# Patient Record
Sex: FEMALE | Race: WHITE
[De-identification: names, ages, dates, MRNs, and addresses within clinical notes are randomized per-mention and may not be internally consistent; named-entity substitution may affect disease eponyms.]

---

## 2019-04-28 ENCOUNTER — HOSPITAL ENCOUNTER (EMERGENCY)
Dept: HOSPITAL 92 - ERS | Age: 49
Discharge: HOME | End: 2019-04-28
Payer: COMMERCIAL

## 2019-04-28 DIAGNOSIS — Z79.899: ICD-10-CM

## 2019-04-28 DIAGNOSIS — E11.9: ICD-10-CM

## 2019-04-28 DIAGNOSIS — Z79.4: ICD-10-CM

## 2019-04-28 DIAGNOSIS — F17.210: ICD-10-CM

## 2019-04-28 DIAGNOSIS — L03.115: Primary | ICD-10-CM

## 2019-04-28 LAB
ALBUMIN SERPL BCG-MCNC: 3.6 G/DL (ref 3.5–5)
ALP SERPL-CCNC: 84 U/L (ref 40–150)
ALT SERPL W P-5'-P-CCNC: 10 U/L (ref 8–55)
ANION GAP SERPL CALC-SCNC: 13 MMOL/L (ref 10–20)
AST SERPL-CCNC: 11 U/L (ref 5–34)
BASOPHILS # BLD AUTO: 0 THOU/UL (ref 0–0.2)
BASOPHILS NFR BLD AUTO: 0.5 % (ref 0–1)
BILIRUB SERPL-MCNC: 0.3 MG/DL (ref 0.2–1.2)
BUN SERPL-MCNC: 20 MG/DL (ref 7–18.7)
CALCIUM SERPL-MCNC: 9.6 MG/DL (ref 7.8–10.44)
CHLORIDE SERPL-SCNC: 106 MMOL/L (ref 98–107)
CO2 SERPL-SCNC: 25 MMOL/L (ref 22–29)
CREAT CL PREDICTED SERPL C-G-VRATE: 0 ML/MIN (ref 70–130)
EOSINOPHIL # BLD AUTO: 0.1 THOU/UL (ref 0–0.7)
EOSINOPHIL NFR BLD AUTO: 0.9 % (ref 0–10)
GLOBULIN SER CALC-MCNC: 4.1 G/DL (ref 2.4–3.5)
GLUCOSE SERPL-MCNC: 217 MG/DL (ref 70–105)
HGB BLD-MCNC: 10.8 G/DL (ref 12–16)
LYMPHOCYTES # BLD: 1.9 THOU/UL (ref 1.2–3.4)
LYMPHOCYTES NFR BLD AUTO: 21.7 % (ref 21–51)
MCH RBC QN AUTO: 26.3 PG (ref 27–31)
MCV RBC AUTO: 82.3 FL (ref 78–98)
MONOCYTES # BLD AUTO: 0.5 THOU/UL (ref 0.11–0.59)
MONOCYTES NFR BLD AUTO: 6.1 % (ref 0–10)
NEUTROPHILS # BLD AUTO: 6.2 THOU/UL (ref 1.4–6.5)
NEUTROPHILS NFR BLD AUTO: 70.9 % (ref 42–75)
PLATELET # BLD AUTO: 310 THOU/UL (ref 130–400)
POTASSIUM SERPL-SCNC: 4.5 MMOL/L (ref 3.5–5.1)
RBC # BLD AUTO: 4.1 MILL/UL (ref 4.2–5.4)
SODIUM SERPL-SCNC: 139 MMOL/L (ref 136–145)
WBC # BLD AUTO: 8.7 THOU/UL (ref 4.8–10.8)

## 2019-04-28 PROCEDURE — 87040 BLOOD CULTURE FOR BACTERIA: CPT

## 2019-04-28 PROCEDURE — 36415 COLL VENOUS BLD VENIPUNCTURE: CPT

## 2019-04-28 PROCEDURE — 96367 TX/PROPH/DG ADDL SEQ IV INF: CPT

## 2019-04-28 PROCEDURE — 80053 COMPREHEN METABOLIC PANEL: CPT

## 2019-04-28 PROCEDURE — 96365 THER/PROPH/DIAG IV INF INIT: CPT

## 2019-04-28 PROCEDURE — 85025 COMPLETE CBC W/AUTO DIFF WBC: CPT

## 2019-04-28 PROCEDURE — 96366 THER/PROPH/DIAG IV INF ADDON: CPT

## 2019-04-28 NOTE — RAD
Right ankle: 3 views



INDICATIONS: Ankle pain.



FINDINGS: Severe degenerative changes at tibiotalar joint, talonavicular and visualized intertarsal j
oint. There is collapse of the talus and deformity of the navicular. Findings indicate a Charcot

type foot. There is joint effusion at the tibiotalar joint and at the talonavicular. Soft tissue swel
ling is prominent both medially and laterally. Pes planus.



IMPRESSION: Severe degenerative changes with deformity of tarsals  consistent with a Charcot type bushra
nt.



Reported By: Juan Moody 

Electronically Signed:  4/28/2019 12:58 PM

## 2019-08-11 ENCOUNTER — HOSPITAL ENCOUNTER (INPATIENT)
Dept: HOSPITAL 92 - ERS | Age: 49
LOS: 7 days | Discharge: HOME | DRG: 603 | End: 2019-08-18
Attending: INTERNAL MEDICINE | Admitting: INTERNAL MEDICINE
Payer: SELF-PAY

## 2019-08-11 VITALS — BODY MASS INDEX: 53.8 KG/M2

## 2019-08-11 DIAGNOSIS — Z91.040: ICD-10-CM

## 2019-08-11 DIAGNOSIS — D63.1: ICD-10-CM

## 2019-08-11 DIAGNOSIS — F17.210: ICD-10-CM

## 2019-08-11 DIAGNOSIS — E78.5: ICD-10-CM

## 2019-08-11 DIAGNOSIS — Z90.49: ICD-10-CM

## 2019-08-11 DIAGNOSIS — E11.610: ICD-10-CM

## 2019-08-11 DIAGNOSIS — L02.611: Primary | ICD-10-CM

## 2019-08-11 DIAGNOSIS — Z98.51: ICD-10-CM

## 2019-08-11 DIAGNOSIS — Z79.899: ICD-10-CM

## 2019-08-11 DIAGNOSIS — Z89.422: ICD-10-CM

## 2019-08-11 DIAGNOSIS — N17.9: ICD-10-CM

## 2019-08-11 DIAGNOSIS — L97.519: ICD-10-CM

## 2019-08-11 DIAGNOSIS — E11.65: ICD-10-CM

## 2019-08-11 DIAGNOSIS — N18.2: ICD-10-CM

## 2019-08-11 DIAGNOSIS — E11.22: ICD-10-CM

## 2019-08-11 DIAGNOSIS — L03.115: ICD-10-CM

## 2019-08-11 DIAGNOSIS — E87.1: ICD-10-CM

## 2019-08-11 DIAGNOSIS — Z79.84: ICD-10-CM

## 2019-08-11 DIAGNOSIS — E66.01: ICD-10-CM

## 2019-08-11 DIAGNOSIS — E11.621: ICD-10-CM

## 2019-08-11 LAB
ALBUMIN SERPL BCG-MCNC: 3.5 G/DL (ref 3.5–5)
ALP SERPL-CCNC: 80 U/L (ref 40–150)
ALT SERPL W P-5'-P-CCNC: 8 U/L (ref 8–55)
ANION GAP SERPL CALC-SCNC: 12 MMOL/L (ref 10–20)
AST SERPL-CCNC: 8 U/L (ref 5–34)
BASOPHILS # BLD AUTO: 0 THOU/UL (ref 0–0.2)
BASOPHILS NFR BLD AUTO: 0.3 % (ref 0–1)
BILIRUB SERPL-MCNC: 0.4 MG/DL (ref 0.2–1.2)
BUN SERPL-MCNC: 17 MG/DL (ref 7–18.7)
CALCIUM SERPL-MCNC: 9.1 MG/DL (ref 7.8–10.44)
CHLORIDE SERPL-SCNC: 102 MMOL/L (ref 98–107)
CO2 SERPL-SCNC: 25 MMOL/L (ref 22–29)
CREAT CL PREDICTED SERPL C-G-VRATE: 0 ML/MIN (ref 70–130)
EOSINOPHIL # BLD AUTO: 0.1 THOU/UL (ref 0–0.7)
EOSINOPHIL NFR BLD AUTO: 0.9 % (ref 0–10)
GLOBULIN SER CALC-MCNC: 4.1 G/DL (ref 2.4–3.5)
GLUCOSE SERPL-MCNC: 296 MG/DL (ref 70–105)
HGB BLD-MCNC: 10.2 G/DL (ref 12–16)
LYMPHOCYTES # BLD: 2.1 THOU/UL (ref 1.2–3.4)
LYMPHOCYTES NFR BLD AUTO: 19.6 % (ref 21–51)
MCH RBC QN AUTO: 27.1 PG (ref 27–31)
MCV RBC AUTO: 83.8 FL (ref 78–98)
MONOCYTES # BLD AUTO: 0.8 THOU/UL (ref 0.11–0.59)
MONOCYTES NFR BLD AUTO: 7.6 % (ref 0–10)
NEUTROPHILS # BLD AUTO: 7.6 THOU/UL (ref 1.4–6.5)
NEUTROPHILS NFR BLD AUTO: 71.6 % (ref 42–75)
PLATELET # BLD AUTO: 341 THOU/UL (ref 130–400)
POTASSIUM SERPL-SCNC: 4.1 MMOL/L (ref 3.5–5.1)
RBC # BLD AUTO: 3.78 MILL/UL (ref 4.2–5.4)
SODIUM SERPL-SCNC: 135 MMOL/L (ref 136–145)
WBC # BLD AUTO: 10.7 THOU/UL (ref 4.8–10.8)

## 2019-08-11 PROCEDURE — 36416 COLLJ CAPILLARY BLOOD SPEC: CPT

## 2019-08-11 PROCEDURE — 87070 CULTURE OTHR SPECIMN AEROBIC: CPT

## 2019-08-11 PROCEDURE — 76770 US EXAM ABDO BACK WALL COMP: CPT

## 2019-08-11 PROCEDURE — 36415 COLL VENOUS BLD VENIPUNCTURE: CPT

## 2019-08-11 PROCEDURE — 90471 IMMUNIZATION ADMIN: CPT

## 2019-08-11 PROCEDURE — 87205 SMEAR GRAM STAIN: CPT

## 2019-08-11 PROCEDURE — 78806: CPT

## 2019-08-11 PROCEDURE — 84300 ASSAY OF URINE SODIUM: CPT

## 2019-08-11 PROCEDURE — G0009 ADMIN PNEUMOCOCCAL VACCINE: HCPCS

## 2019-08-11 PROCEDURE — 81001 URINALYSIS AUTO W/SCOPE: CPT

## 2019-08-11 PROCEDURE — 83036 HEMOGLOBIN GLYCOSYLATED A1C: CPT

## 2019-08-11 PROCEDURE — 82570 ASSAY OF URINE CREATININE: CPT

## 2019-08-11 PROCEDURE — A4641 RADIOPHARM DX AGENT NOC: HCPCS

## 2019-08-11 PROCEDURE — 80202 ASSAY OF VANCOMYCIN: CPT

## 2019-08-11 PROCEDURE — 85652 RBC SED RATE AUTOMATED: CPT

## 2019-08-11 PROCEDURE — 85025 COMPLETE CBC W/AUTO DIFF WBC: CPT

## 2019-08-11 PROCEDURE — 80053 COMPREHEN METABOLIC PANEL: CPT

## 2019-08-11 PROCEDURE — 96365 THER/PROPH/DIAG IV INF INIT: CPT

## 2019-08-11 PROCEDURE — 96367 TX/PROPH/DG ADDL SEQ IV INF: CPT

## 2019-08-11 PROCEDURE — A9521 TC99M EXAMETAZIME: HCPCS

## 2019-08-11 PROCEDURE — 90732 PPSV23 VACC 2 YRS+ SUBQ/IM: CPT

## 2019-08-11 PROCEDURE — 80048 BASIC METABOLIC PNL TOTAL CA: CPT

## 2019-08-11 PROCEDURE — 87040 BLOOD CULTURE FOR BACTERIA: CPT

## 2019-08-11 RX ADMIN — INSULIN HUMAN PRN UNIT: 100 INJECTION, SOLUTION PARENTERAL at 17:19

## 2019-08-11 RX ADMIN — INSULIN HUMAN PRN UNIT: 100 INJECTION, SOLUTION PARENTERAL at 21:00

## 2019-08-11 NOTE — RAD
THREE VIEWS RIGHT FOOT:

 

HISTORY: 

Diabetic foot wound.  Subcutaneous soft tissue swelling right foot with ulceration.

 

COMPARISON: 

Fused right ankle on 4/28/2019.

 

FINDINGS: 

Again noted is deformity of the navicular bone as well as the anterior process of the talus with subc
hondral sclerosis and irregularity involving the tarsal bones.  There are irregular erosions and subc
hondral cystic changes involving the navicular bone as well as the distal aspect of the talus.  There
 is a pes planus-type deformity.  There is plantar displacement of the anterior process of the talus 
with irregular articulation of the navicular bone with the anterior process of the anterior aspect of
 the distal tibia with collapse of the mid foot.  Findings are likely related to a Charcot-type joint
 as suggested on the prior exam.  Plantar calcaneal enthesophyte is identified.  There is subcutaneou
s soft tissue swelling seen about the ankle much greater medially.  There is an oval-shaped area of s
ubcutaneous emphysema along the plantar aspect of the mid foot likely related to patient's known woun
d in this region.  Given the degree of irregularity involving the mid foot, osteomyelitis would be di
fficult to entirely exclude, although there has been no significant interval change compared to the p
rior exam.  There is evidence of a joint effusion.  

 

IMPRESSION: 

1.  Collapse of the mid foot with osseous irregularity and displacement of the anterior process of th
e talus with respect to articulation with the navicular bone with pes planus deformity.  Findings are
 likely related to Charcot-type joint.

 

2.  Diffuse subcutaneous soft tissue swelling and focus of gas in the plantar subcutaneous soft tissu
es likely related to patient's known wound in this region.

 

3.  Joint effusion.

 

4.  Given the appearance of the mid foot and areas of sclerosis and osseous irregularity, osteomyelit
is would be difficult to exclude based on these overlying changes.

 

POS: BAR

## 2019-08-11 NOTE — HP
PRIMARY CARE:  [facility].



PRIMARY PODIATRIST:  Ralf Carroll DPM.



CHIEF COMPLAINT:  Right foot redness and swelling of 2 days duration.



HISTORY OF PRESENT ILLNESS:  The patient is a 49-year-old  female with

Charcot foot, diabetes mellitus type 2, and left great toe amputation in the 
past

for osteomyelitis, presented to the emergency room with swelling and pain of the

right foot that started 2 days ago.  She also had significant warmth and pain.  
The

pain was mild.  She uses a walking boot.  She was evaluated by evaluated

at Podiatry Clinic 2 weeks ago.  She was advised to follow up with Wound Care 
for

chronic right foot ulcer.  She denies any fever or chills. 



PAST MEDICAL HISTORY:  

1. Diabetes mellitus type 2.

2. Chronic right foot ulcer.

3. Hypertension.

4. Dyslipidemia.

5. Osteomyelitis of the left great toe, status post amputation in 2014.

6. Morbid obesity.

7. Hypertension.



PAST SURGICAL HISTORY:  

1. Tubal ligation.

2. Left great toe amputation.

3. Cholecystectomy.



ALLERGIES:  NO KNOWN DRUG ALLERGIES.



MEDICATIONS:  Current home medications;

1. Glipizide 10 mg b.i.d. with meals.

2. Levemir 30 units b.i.d.

3. Lisinopril 5 mg daily.

4. Metformin 1000 mg b.i.d.



SOCIAL HISTORY:  The patient smokes on and off.  Denies any alcohol or drug use.



FAMILY HISTORY:  Diabetes runs in her family.



REVIEW OF SYSTEMS:  All other review of systems was reviewed and were found 
negative.



PHYSICAL EXAMINATION:

VITAL SIGNS:  Temperature 98.3, respirations 17, pulse of 93, blood pressure of

151/89, O2 saturation 98% on room air. 

GENERAL:  A 49-year-old female, in no apparent distress HEENT:  Head, 
atraumatic and

normocephalic.  Sclerae anicteric.  Moist mucous membranes.  No oral lesion. 

NECK:  Supple.  No JVD appreciated.  No carotid bruit. 

LUNGS:  Clear to auscultation bilaterally.  No wheezing, rales, or rhonchi. 

HEART:  S1 and S2 present.  Regular rate and rhythm.  No rubs or gallops. 

ABDOMEN:  Soft, nontender.  Bowel sounds present. 

EXTREMITIES:  There is erythema along with warmth and tenderness over the right

foot.  There is significant erythema mainly over the medial malleolar.  There is

dressing present all over the plantar aspect of the foot. 

SKIN:  As discussed above. 

LYMPH NODES:  No palpable lymph nodes in the neck. 

PERIPHERAL VASCULAR:  Radial pulses palpable bilaterally.  I was unable to feel 
the

dorsalis pedis, although posterior tibial pulses in the right foot. 



LABORATORY FINDINGS:  WBC 10.7, hemoglobin 10.2, hematocrit 31.7, and platelet 
count

of 341. 



ESR 76. 



Hemoglobin A1c 9.6.  Sodium 135, potassium 4.1, chloride 102, bicarb 25, BUN 17,

creatinine 0.87. 



Foot x-ray by my review was negative for osteomyelitis.  It showed Charcot joint

with diffuse subcutaneous soft tissue swelling.  There is gas in the plantar

subcutaneous soft tissue, likely related to the chronic ulcer. 



IMPRESSION:  

1. Right foot cellulitis/diabetic foot infection.

2. Diabetes mellitus type 2, uncontrolled.  A1c was 9.6.

3. Morbid obesity with a BMI of 53.8.

4. Chronic kidney disease, stage 2.

5. Hypertension.

6. Suspected sleep apnea.

7. Mild hyponatremia.

8. Elevated inflammatory markers.

9. Chronic anemia.



PLAN:  The patient will be monitored on the medical floor.  Vancomycin and Zosyn

will be continued.  Blood cultures have been sent.  Gentle hydration.  Consult

Podiatry in a.m.  The patient will be kept n.p.o. past midnight.  Diabetic diet 
for

now.  DVT prophylaxis with Lovenox.  Resume lisinopril, glipizide, metformin.  
Wound

Care consultation. 



Plan of care was discussed with the patient in detail, she stated understanding.







Job ID:  [id number]



MTDD

## 2019-08-12 LAB
ALBUMIN SERPL BCG-MCNC: 3.1 G/DL (ref 3.5–5)
ALP SERPL-CCNC: 66 U/L (ref 40–150)
ALT SERPL W P-5'-P-CCNC: (no result) U/L (ref 8–55)
ANION GAP SERPL CALC-SCNC: 14 MMOL/L (ref 10–20)
AST SERPL-CCNC: 11 U/L (ref 5–34)
BASOPHILS # BLD AUTO: 0.1 THOU/UL (ref 0–0.2)
BASOPHILS NFR BLD AUTO: 0.5 % (ref 0–1)
BILIRUB SERPL-MCNC: 0.3 MG/DL (ref 0.2–1.2)
BUN SERPL-MCNC: 14 MG/DL (ref 7–18.7)
CALCIUM SERPL-MCNC: 8.9 MG/DL (ref 7.8–10.44)
CHLORIDE SERPL-SCNC: 107 MMOL/L (ref 98–107)
CO2 SERPL-SCNC: 21 MMOL/L (ref 22–29)
CREAT CL PREDICTED SERPL C-G-VRATE: 179 ML/MIN (ref 70–130)
EOSINOPHIL # BLD AUTO: 0.2 THOU/UL (ref 0–0.7)
EOSINOPHIL NFR BLD AUTO: 2.6 % (ref 0–10)
GLOBULIN SER CALC-MCNC: 3.7 G/DL (ref 2.4–3.5)
GLUCOSE SERPL-MCNC: 93 MG/DL (ref 70–105)
HGB BLD-MCNC: 9.1 G/DL (ref 12–16)
LYMPHOCYTES # BLD: 2.4 THOU/UL (ref 1.2–3.4)
LYMPHOCYTES NFR BLD AUTO: 26.1 % (ref 21–51)
MCH RBC QN AUTO: 26.1 PG (ref 27–31)
MCV RBC AUTO: 83.1 FL (ref 78–98)
MONOCYTES # BLD AUTO: 0.8 THOU/UL (ref 0.11–0.59)
MONOCYTES NFR BLD AUTO: 8.3 % (ref 0–10)
NEUTROPHILS # BLD AUTO: 5.8 THOU/UL (ref 1.4–6.5)
NEUTROPHILS NFR BLD AUTO: 62.5 % (ref 42–75)
PLATELET # BLD AUTO: 288 THOU/UL (ref 130–400)
POTASSIUM SERPL-SCNC: 4.1 MMOL/L (ref 3.5–5.1)
RBC # BLD AUTO: 3.49 MILL/UL (ref 4.2–5.4)
SODIUM SERPL-SCNC: 138 MMOL/L (ref 136–145)
VANCOMYCIN TROUGH SERPL-MCNC: 29.5 UG/ML
WBC # BLD AUTO: 9.3 THOU/UL (ref 4.8–10.8)

## 2019-08-12 NOTE — MRI
MRI RIGHT FOOT:

 

08/12/2019

 

PROVIDED CLINICAL HISTORY:

Chronic nonhealing open wound on the plantar surface of the right foot for two months.

 

COMPARISON:

Radiographs performed on 08/11/2019.

 

FINDINGS:

Evaluation is limited by patient motion.

 

There is conspicuous diminished signal intensity on T1 weighted sequences and increased signal intens
ity on fluid sensitive sequences involving the talus, anterior calcaneus, distal tibia, distal fibula
, and navicula.  To a lesser extent, the cuboid and cuneiforms demonstrate similar signal alteration.
  There is extensive fragment irregularity to the navicular cortical margins at the talar articular s
urface.  There is dorsal dislocation of the head of the talus with respect to the navicula.  There is
 a tibiotalar joint effusion.  There is signal alteration within the subcutaneous adipose layer, at t
he plantar aspect of the hindfoot-midfoot junction, medially, which approximates the dislocated talar
 head.

 

There is greater than physiologic tenosynovial fluid involving the medial flexor and peroneal tendons
.

 

Assessment for a focal fluid collection, such as abscess, is limited, given lack of IV contrast mater
ial.  There is conspicuous fluid signal intensity about the flexor hallucis longus tendon in its cour
se within the midfoot.

 

IMPRESSION:

1.  Diffuse hindfoot and midfoot signal abnormality with associated talonavicular dislocation.  This 
can be seen in the setting of a neuropathic joint.  Given the proximity to an open wound, changes of 
osteomyelitis and tibiotalar septic arthritis cannot be excluded.

 

2.  Medial flexor and peroneal tenosynovial fluid that may reflect infectious tenosynovitis.

 

POS: EMMETT

## 2019-08-12 NOTE — PDOC.EVN
Event Note





- Event Note


Event Note: 





One of 2 blood cultures positive; continue current Rx.

## 2019-08-12 NOTE — PDOC.HOSPP
- Subjective


Encounter Date: 08/12/19 (f/u foot ulcer)


Encounter Time: 15:09


Subjective: 





Pt hungry, c/o headache, has been kept NPO today in case of Podiatry 

intervention.  Denies any other concerns.





- Objective


Vital Signs & Weight: 


 Vital Signs (12 hours)











  Temp Pulse Resp BP BP BP Pulse Ox


 


 08/12/19 12:00  98.1 F      


 


 08/12/19 11:00  98.1 F  83  18    129/75  99


 


 08/12/19 09:17   76   113/68   


 


 08/12/19 08:00  98.1 F  76  18   113/68   98


 


 08/12/19 04:00  98.0 F  76  18    127/62  97








 Weight











Admit Weight                   294 lb 1.6 oz


 


Weight                         294 lb 1.6 oz














I&O: 


 











 08/11/19 08/12/19 08/13/19





 06:59 06:59 06:59


 


Intake Total  2305 


 


Balance  2305 











Result Diagrams: 


 08/12/19 06:29





 08/12/19 06:29


Additional Labs: 


 Accuchecks











  08/12/19 08/12/19 08/11/19





  11:20 05:03 20:48


 


POC Glucose  133 H  94  289 H














  08/11/19





  16:49


 


POC Glucose  249 H














ROS





- Medication


Medications: 


Active Medications











Generic Name Dose Route Start Last Admin





  Trade Name Freq  PRN Reason Stop Dose Admin


 


Acetaminophen  650 mg  08/11/19 12:31  08/12/19 11:29





  Tylenol  PO   650 mg





  Q4H PRN   Administration





  Headache/Fever/Mild Pain (1-3)   





     





     





     


 


Enoxaparin Sodium  40 mg  08/12/19 09:00  08/12/19 09:15





  Lovenox  SC   Not Given





  0900 [facility]   





     





     





     





     


 


Glipizide  10 mg  08/12/19 07:30  08/12/19 08:33





  Glucotrol  PO   Not Given





  BID-AC ALVAREZ   





     





     





     





     


 


Vancomycin HCl 2 gm/ Sodium  500 mls @ 250 mls/hr  08/11/19 23:59  08/12/19 12:

29





  Chloride  IVPB   500 mls





  1200,2359 ALVAREZ   Administration





     





     





     





     


 


Insulin Glargine 20 units/  0.2 mls @ 0 mls/hr  08/12/19 09:00  08/12/19 09:16





  Miscellaneous Medication  SC   Not Given





  QAM ALVAREZ   





     





     





     





     


 


Piperacillin Sod/Tazobactam  100 mls @ 200 mls/hr  08/11/19 18:00  08/12/19 11:

31





  Sod 3.375 gm/ Sodium Chloride  IVPB   100 mls





  0600,1200,1800,2359 ALVAREZ   Administration





     





     





     





     


 


Insulin Human Regular  0 units  08/11/19 12:31  08/11/19 17:19





  Humulin R  SC   4 unit





  .MODERATE SLIDING SC PRN   Administration





  Moderate Correctional Scale   





     





     





     


 


Insulin Human Regular  0 units  08/11/19 12:31  08/11/19 21:00





  Humulin R  SC   3 unit





  .BEDTIME SLIDING SC PRN   Administration





  Bedtime Correctional Scale   





     





     





     


 


Lisinopril  5 mg  08/12/19 09:00  08/12/19 09:17





  Zestril  PO   5 mg





  DAILY ALVAREZ   Administration





     





     





     





     


 


Metformin HCl  1,000 mg  08/11/19 17:00  08/12/19 09:15





  Glucophage  PO   Not Given





  BID-WM ALVAREZ   





     





     





     





     














- Exam


NAD


Heart: RRR, no murmur


Respiratory: CTAB, no wheezes, no rales, no ronchi


Gastrointestinal: soft, non-tender, non-distended, normal bowel sounds


Extremities: no cyanosis


Extremeties - other findings: right foot - charcot apperaance, erythema medially

, ulcer on foot


Psychiatric: normal affect





Hosp A/P


(1) Foot ulcer


Code(s): L97.509 - NON-PRESSURE CHRONIC ULCER OTH PRT UNSP FOOT W UNSP SEVERITY

   Status: Acute   


Qualifiers: 


   Laterality: right   Non-pressure ulcer stage: unspecified non-pressure ulcer 

stage   Qualified Code(s): L97.519 - Non-pressure chronic ulcer of other part 

of right foot with unspecified severity   





(2) Diabetes mellitus


Code(s): E11.9 - TYPE 2 DIABETES MELLITUS WITHOUT COMPLICATIONS   Status: 

Chronic   


Qualifiers: 


   Diabetes mellitus type: type 2   Diabetes mellitus long term insulin use: 

with long term use   Diabetes mellitus complication status: with kidney 

complications   Chronic kidney disease stage: stage 2 (mild) 





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


Code(s): E11.22 - TYPE 2 DIABETES MELLITUS W DIABETIC CHRONIC KIDNEY DISEASE; 

N18.2 - CHRONIC KIDNEY DISEASE, STAGE 2 (MILD)   Status: Chronic   





(4) Hypertension


Code(s): I10 - ESSENTIAL (PRIMARY) HYPERTENSION   Status: Chronic   


Qualifiers: 


   Hypertension type: essential hypertension   Qualified Code(s): I10 - 

Essential (primary) hypertension   





(5) Obesity


Code(s): E66.9 - OBESITY, UNSPECIFIED   Status: Chronic   


Qualifiers: 


   Body mass index: BMI 50.0-59.9 





(6) Charcot's joint


Code(s): M14.60 - CHARCOT'S JOINT, UNSPECIFIED SITE   Status: Chronic   





(7) Anemia


Code(s): D64.9 - ANEMIA, UNSPECIFIED   Status: Chronic   


Qualifiers: 


   Anemia type: unspecified type   Qualified Code(s): D64.9 - Anemia, 

unspecified   





- Plan





Foot ulcer with medial foot cellulitis in the context of Charcot foot


- continue Vanc & Zosyn


- Consult ID


- Consult Podiatry placed


- obtain MRI to look for changes such as osteomyelitis - given underlying bone 

destruction assoc with Charcot - uncertain if we will be able to tell by MRI 

but will try





DM - continue lower dose lantus and adjust as tolerated


- continue other 2 oral meds


- advance diet to carb consistent





dvt prophy - lovenox


gi prophy - not indicated


code status full





anticipate pt will be here 2-3 days at a minimum for tx.  Reviewed plan of care 

with patient, no questions or further needs at end of eval.  Pt demonstrates 

understanding and agrees.

## 2019-08-13 LAB
ANION GAP SERPL CALC-SCNC: 12 MMOL/L (ref 10–20)
BASOPHILS # BLD AUTO: 0.1 THOU/UL (ref 0–0.2)
BASOPHILS NFR BLD AUTO: 0.9 % (ref 0–1)
BUN SERPL-MCNC: 13 MG/DL (ref 7–18.7)
CALCIUM SERPL-MCNC: 8.7 MG/DL (ref 7.8–10.44)
CHLORIDE SERPL-SCNC: 106 MMOL/L (ref 98–107)
CO2 SERPL-SCNC: 24 MMOL/L (ref 22–29)
CREAT CL PREDICTED SERPL C-G-VRATE: 135 ML/MIN (ref 70–130)
EOSINOPHIL # BLD AUTO: 0.1 THOU/UL (ref 0–0.7)
EOSINOPHIL NFR BLD AUTO: 1.4 % (ref 0–10)
GLUCOSE SERPL-MCNC: 208 MG/DL (ref 70–105)
HGB BLD-MCNC: 9.6 G/DL (ref 12–16)
LYMPHOCYTES # BLD: 1.7 THOU/UL (ref 1.2–3.4)
LYMPHOCYTES NFR BLD AUTO: 22 % (ref 21–51)
MCH RBC QN AUTO: 27.6 PG (ref 27–31)
MCV RBC AUTO: 83.2 FL (ref 78–98)
MONOCYTES # BLD AUTO: 0.5 THOU/UL (ref 0.11–0.59)
MONOCYTES NFR BLD AUTO: 6.7 % (ref 0–10)
NEUTROPHILS # BLD AUTO: 5.4 THOU/UL (ref 1.4–6.5)
NEUTROPHILS NFR BLD AUTO: 69 % (ref 42–75)
PLATELET # BLD AUTO: 327 THOU/UL (ref 130–400)
POTASSIUM SERPL-SCNC: 4 MMOL/L (ref 3.5–5.1)
RBC # BLD AUTO: 3.48 MILL/UL (ref 4.2–5.4)
SODIUM SERPL-SCNC: 138 MMOL/L (ref 136–145)
WBC # BLD AUTO: 7.9 THOU/UL (ref 4.8–10.8)

## 2019-08-13 RX ADMIN — INSULIN GLARGINE SCH MLS: 100 INJECTION, SOLUTION SUBCUTANEOUS at 09:09

## 2019-08-13 RX ADMIN — INSULIN HUMAN PRN UNIT: 100 INJECTION, SOLUTION PARENTERAL at 17:45

## 2019-08-13 RX ADMIN — INSULIN HUMAN PRN UNIT: 100 INJECTION, SOLUTION PARENTERAL at 11:55

## 2019-08-13 RX ADMIN — INSULIN HUMAN PRN UNIT: 100 INJECTION, SOLUTION PARENTERAL at 05:47

## 2019-08-13 NOTE — PDOC.HOSPP
- Subjective


Encounter Date: 08/13/19 (f/u DM foot ulcer)


Encounter Time: 14:17


Subjective: 





Pt denies any changes currently. Denies pain, n/v/abd pain/diarrhea or other 

concerns





- Objective


Vital Signs & Weight: 


 Vital Signs (12 hours)











  Temp Pulse Resp BP BP BP Pulse Ox


 


 08/13/19 11:00  98.3 F  81  20    135/80  100


 


 08/13/19 09:01   80   135/80   


 


 08/13/19 07:27  98.6 F  80  18   135/80   98


 


 08/13/19 04:00  99.2 F  85  20    142/82 H  97








 Weight











Admit Weight                   294 lb 1.6 oz


 


Weight                         294 lb 1.6 oz














I&O: 


 











 08/12/19 08/13/19 08/14/19





 06:59 06:59 06:59


 


Intake Total 2305 1890 


 


Balance 2305 1890 











Result Diagrams: 


 08/13/19 07:47





 08/13/19 05:35


Additional Labs: 


 Accuchecks











  08/13/19 08/13/19 08/12/19





  11:21 05:08 20:34


 


POC Glucose  194 H  228 H  198 H














  08/12/19





  15:50


 


POC Glucose  112 H














ROS





- Medication


Medications: 


Active Medications











Generic Name Dose Route Start Last Admin





  Trade Name Freq  PRN Reason Stop Dose Admin


 


Acetaminophen  650 mg  08/11/19 12:31  08/12/19 11:29





  Tylenol  PO   650 mg





  Q4H PRN   Administration





  Headache/Fever/Mild Pain (1-3)   





     





     





     


 


Enoxaparin Sodium  40 mg  08/12/19 09:00  08/13/19 09:02





  Lovenox  SC   40 mg





  0900 ALVAREZ   Administration





     





     





     





     


 


Glipizide  10 mg  08/12/19 07:30  08/13/19 09:12





  Glucotrol  PO   Not Given





  BID-AC ALVAREZ   





     





     





     





     


 


Piperacillin Sod/Tazobactam  100 mls @ 200 mls/hr  08/11/19 18:00  08/13/19 11:

54





  Sod 3.375 gm/ Sodium Chloride  IVPB   100 mls





  0600,1200,1800,2359 ALVAREZ   Administration





     





     





     





     


 


Insulin Glargine 30 units/  0.3 mls @ 0 mls/hr  08/13/19 09:00  08/13/19 09:09





  Miscellaneous Medication  SC   0.3 mls





  QAM ALVAREZ   Administration





     





     





     





     


 


Insulin Human Regular  0 units  08/11/19 12:31  08/13/19 11:55





  Humulin R  SC   2 unit





  .MODERATE SLIDING SC PRN   Administration





  Moderate Correctional Scale   





     





     





     


 


Insulin Human Regular  0 units  08/11/19 12:31  08/11/19 21:00





  Humulin R  SC   3 unit





  .BEDTIME SLIDING SC PRN   Administration





  Bedtime Correctional Scale   





     





     





     


 


Lisinopril  5 mg  08/12/19 09:00  08/13/19 09:01





  Zestril  PO   5 mg





  DAILY ALVAREZ   Administration





     





     





     





     


 


Metformin HCl  1,000 mg  08/11/19 17:00  08/13/19 09:00





  Glucophage  PO   1,000 mg





  BID-WM ALVAREZ   Administration





     





     





     





     














- Exam


NAD


Heart: RRR, no murmur, no gallops, no rubs


Respiratory: CTAB, no wheezes, no rales


Gastrointestinal: soft, non-tender, non-distended, normal bowel sounds


Extremities: no cyanosis


Skin - other findings: Unchanged charcot foot with erythema/edema medially, 

ulcer on dorsum


Psychiatric: normal affect





Hosp A/P


(1) Foot ulcer


Code(s): L97.509 - NON-PRESSURE CHRONIC ULCER OTH PRT UNSP FOOT W UNSP SEVERITY

   Status: Acute   


Qualifiers: 


   Laterality: right   Non-pressure ulcer stage: unspecified non-pressure ulcer 

stage   Qualified Code(s): L97.519 - Non-pressure chronic ulcer of other part 

of right foot with unspecified severity   





(2) Diabetes mellitus


Code(s): E11.9 - TYPE 2 DIABETES MELLITUS WITHOUT COMPLICATIONS   Status: 

Chronic   


Qualifiers: 


   Diabetes mellitus type: type 2   Diabetes mellitus long term insulin use: 

with long term use   Diabetes mellitus complication status: with kidney 

complications   Chronic kidney disease stage: stage 2 (mild) 





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


Code(s): E11.22 - TYPE 2 DIABETES MELLITUS W DIABETIC CHRONIC KIDNEY DISEASE; 

N18.2 - CHRONIC KIDNEY DISEASE, STAGE 2 (MILD)   Status: Chronic   





(4) Hypertension


Code(s): I10 - ESSENTIAL (PRIMARY) HYPERTENSION   Status: Chronic   


Qualifiers: 


   Hypertension type: essential hypertension   Qualified Code(s): I10 - 

Essential (primary) hypertension   





(5) Obesity


Code(s): E66.9 - OBESITY, UNSPECIFIED   Status: Chronic   


Qualifiers: 


   Body mass index: BMI 50.0-59.9 





(6) Charcot's joint


Code(s): M14.60 - CHARCOT'S JOINT, UNSPECIFIED SITE   Status: Chronic   





(7) Anemia


Code(s): D64.9 - ANEMIA, UNSPECIFIED   Status: Chronic   


Qualifiers: 


   Anemia type: unspecified type   Qualified Code(s): D64.9 - Anemia, 

unspecified   





- Plan





Foot ulcer with medial foot cellulitis in the context of Charcot foot


- continue Zosyn - will d/c Vanc


- Consult ID placed last night


- Consult Podiatry placed - called Dr. Carroll's office and told that Dr. Owens 

will see the patient this afternoon after clinic


- MRI complete - cannot rule in or out osteomyelitis


- wound care consult placed





Slight change in creatinine - d/c Vanc as I'm not certain it is needed, monitor 

renal function, encourage PO fluid intake





DM - increase AM lantus to 30 units and consider adding back HS dose of 30 units





dvt prophy - lovenox


gi prophy - not indicated


code status full





anticipate pt will be here 2-3 days at a minimum for tx.  Reviewed plan of care 

with patient, no questions or further needs at end of eval.  Pt demonstrates 

understanding and agrees.

## 2019-08-13 NOTE — CON
DATE OF CONSULTATION:  08/13/2019



REASON FOR CONSULTATION:  Infection in right foot.



HISTORY OF PRESENT ILLNESS:  A 49-year-old  female with history of type 
2

diabetes, Charcot deformity of the right foot, left great toe amputation in the 
past

for osteomyelitis.  She has been treated by Dr. Carroll in our clinic for a 
chronic

right foot ulceration to the plantar mid foot.  She had recently been referred 
over

to [facility] for advanced care.  She states she has been using a walking

boot and believes over the weekend that she rubbed an irritating area on the 
medial

ankle from that boot, it turned black and then on Sunday she had noticed that it

turned red.  She was instructed by Dr. Carroll to present to the emergency 
department

if she ever had any fever or worsening signs of infection, so she did.  She was

admitted to the hospital and placed on vancomycin and Zosyn.  Had x-ray and MRI 
of

her right foot, which were inconclusive for osteomyelitis due to the underlying

Charcot deformity and previous fractures and dislocations within the foot.  
Denies

any fevers or chills. 



PAST MEDICAL HISTORY:  

1. Type 2 diabetes.

2. Chronic right foot ulceration.

3. Hypertension.

4. Dyslipidemia.

5. Osteomyelitis of left great toe, status post amputation in 2014.

6. Morbid obesity.



PAST SURGICAL HISTORY:  

1. Tubal ligation.

2. Left great toe amputation.

3. Cholecystectomy.



MEDICATIONS:  

1. Glipizide.

2. Levemir.

3. Lisinopril.

4. Metformin.



ALLERGIES:  NO KNOWN DRUG ALLERGIES.



REVIEW OF SYSTEMS:  All other review of systems reviewed and all were found 
negative

other than what was indicated in the history of present illness. 



PHYSICAL EXAMINATION:

VITAL SIGNS:  Maximum temperature over the last 24 hours 98.6.  Most recent 
pulse is

81, respirations 20, blood pressure 159/80. 

EXTREMITIES:  Focal lower extremity exam, the patient has a rocker-bottom foot 
type

deformity with collapse of the medial longitudinal arch and an abducted foot on 
the

ankle. 

DERMATOLOGIC:  There is a large chronic ulceration on the plantar aspect of the

right medial mid foot.  This has a hyperkeratotic rim.  The wound measures

approximately 1.8 x 1.6 cm with 0.4 cm of depth and has a granular and fibrous 
mixed

base.  There is no drainage or malodor from this wound.  No periwound erythema.
  On

the medial ankle, there is a superficial abscess present with palpable fluid

collection just deep to the superficial skin.  Does not feel that there is any

fluctuance or deep abscess in this area.  There is erythema circumferentially 
around

centrally located on this area and it measures 6 cm in diameter.  No ascending

lymphangitis present. 



LABORATORY DATA:  Most recently, white blood cell count was 7.9, hemoglobin 9.6,

hematocrit 29.0, and platelets 327.  Sodium 138, potassium 4.0, chloride 106, 
carbon

dioxide 24, BUN 13, creatinine 1.05, glucose was 208. 



RADIOGRAPHIC EVALUATION:  I reviewed the films of her right foot and MRI, some

increased fluid sensitive sequence uptake throughout the bones of the hindfoot 
and

ankle.  This could be consistent with the Charcot deformity she has.  X-ray 
show no

new areas of cortical erosion or fractures as compared to previous films of the

ankle from April 28, 2019. 



ASSESSMENT:  

1. Superficial abscess to the medial ankle/hindfoot, right side.

2. Cellulitis.

3. Type 2 diabetes with peripheral neuropathy and Charcot joints.



PLAN:  

1. The area was prepped at bedside utilizing Betadine and alcohol.  A 15 blade 
was

used to sarika the superficial abscess, approximately 2 mL of creamy white 
purulence

was expressed from the wound.  This was not cultured as Dr. Espino actually used 
a

needle to culture the purulence earlier today.  Small pieces of undermined 
tissue

were excised. We decided to leave this wound open.  The wound was flushed with

sterile saline and then packed open with iodoform packing gauze.  Bandages were 
then

applied including 4 x 4 gauze and 4-inch Clementina wrap. 

2. Continue with the Zosyn IV until the culture results are obtained.  She was

recently discontinued from vancomycin, which I feel is fine at this time. 

3. No further surgical interventions required at this time.  I will re-evaluate 
her

tomorrow to look for progression. 

4. Appreciate Dr. Espino' input on antibiotic choice once we receive the 
cultures. 



Thank you for the consultation.







Job ID:  [id number]



[facility]

## 2019-08-13 NOTE — CON
DATE OF CONSULTATION:  08/13/2019



HISTORY OF PRESENT ILLNESS:  A 49-year-old with history of type 2 diabetes, Charcot

arthropathy of right foot with chronic round ulcer of the midaspect of the plantar

surface of the right mid-foot region, who has been managed by Dr. Carroll.  The

patient developed inflammatory changes in medial aspect of the right hindfoot region

below the medial malleolus, and she was admitted.  Now, she has developed what she

describes as a blister directly at the center of this erythematous area.  A little

bit of chills.  No headaches.  No change in visual symptoms.  No sore throat,

odynophagia, or dysphagia.  No dyspnea or cough.  No abdominal pain or diarrhea.  No

genitourinary symptoms. 



PAST MEDICAL HISTORY:  Type 2 diabetes, neuropathy, Charcot arthropathy, chronic

right foot ulcer, dyslipidemia, hypertension, osteomyelitis of left great toe with

amputation, obesity, hypertension. 



PAST SURGICAL HISTORY:  Tubal ligation, left great toe amputation, cholecystectomy.



ALLERGIES:  NONE.



SOCIAL HISTORY:  Current smoker.  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  No

alcoholic beverage use.  Lives in [place] with family. 



FAMILY HISTORY:  Diabetes.



CURRENT MEDICATIONS:  

1. Tylenol.

2. Tums.

3. Lovenox.

4. Glucotrol.

5. Glucagon.

6. Insulin.

7. Zestril.

8. Glucophage.

9. Zofran.

10. Zosyn.



PHYSICAL EXAMINATION:

VITAL SIGNS:  T-max 99.9 and currently 98.3, blood pressure 130/80, pulse 80,

respirations 18, O2 saturation 98. 

SKIN:  Area at the medial right hindfoot region with a central blister.  There was

an extensive erythema surrounding this area and bulging.  No obvious drainage. 

HEENT:  No lymphadenopathy.  Ocular movements conjugate.  Oral cavity with quite a

few missing teeth.  No oral mucosal lesions. 

NECK:  Supple.  No jugular venous distention or carotid bruits. 

LUNGS:  Symmetric, clear breath sounds. 

HEART:  S1 and S2, regular rate.  No S3 or S4. 

ABDOMEN:  Soft, not distended or tender.  No ascites.  No bladder distention. 

EXTREMITIES:  No joint inflammatory activity outside the area of involvement.

Pulses are 1+ in dorsalis pedis. 

NEUROLOGIC:  Nonfocal including cognitive function.



LABORATORY DATA:  White cell count 10.7 and 7.9, hemoglobin 9.6, platelets 341 and

327, creatinine 1.05.  Liver profile normal.  Albumin 3.5.  Microbiology with

bacillus, probably a contaminant of one set of blood cultures.  There was an MRI of

the right foot and ankle, which showed diffuse signal abnormality hindfoot,

talonavicular dislocation and peroneal and medial flexor tenosynovial fluid. 



ASSESSMENT:  

1. Chronic ulcer, bottom aspect of the right foot with now development of

inflammatory changes with a blister.  The blister was punctured and light yellow

fluid was submitted for culture. 

2. Type 2 diabetes.



DISCUSSION:  The differential diagnosis includes cellulitis versus tenosynovitis

versus osteomyelitis of the right hind and mid-foot region associated with Charcot

arthropathy.  We will monitor culture results.  This area would be difficult for

debridement.  I do not think that anybody would consider doing a debridement there

other than Dr. Roach.  We will wait for the culture results and then make a plan. 





Job ID:  [id number]

## 2019-08-14 LAB
ANION GAP SERPL CALC-SCNC: 12 MMOL/L (ref 10–20)
BUN SERPL-MCNC: 13 MG/DL (ref 7–18.7)
CALCIUM SERPL-MCNC: 9.1 MG/DL (ref 7.8–10.44)
CHLORIDE SERPL-SCNC: 108 MMOL/L (ref 98–107)
CO2 SERPL-SCNC: 23 MMOL/L (ref 22–29)
CREAT CL PREDICTED SERPL C-G-VRATE: 127 ML/MIN (ref 70–130)
GLUCOSE SERPL-MCNC: 113 MG/DL (ref 70–105)
LEUKOCYTE ESTERASE UR QL STRIP.AUTO: 75 LEU/UL
POTASSIUM SERPL-SCNC: 3.8 MMOL/L (ref 3.5–5.1)
PROT UR STRIP.AUTO-MCNC: 30 MG/DL
RBC UR QL AUTO: (no result) HPF (ref 0–3)
SODIUM SERPL-SCNC: 139 MMOL/L (ref 136–145)
SODIUM UR-SCNC: 74 MMOL/L
WBC UR QL AUTO: (no result) HPF (ref 0–3)

## 2019-08-14 PROCEDURE — 0H9MXZZ DRAINAGE OF RIGHT FOOT SKIN, EXTERNAL APPROACH: ICD-10-PCS | Performed by: PODIATRIST

## 2019-08-14 RX ADMIN — INSULIN GLARGINE SCH MLS: 100 INJECTION, SOLUTION SUBCUTANEOUS at 08:27

## 2019-08-14 NOTE — PDOC.HOSPP
- Subjective


Encounter Date: 08/14/19 (f/u foot infection)


Encounter Time: 10:39


Subjective: 





Pt is without complaints today - denies n/v/abd pain/diarrhea.  Denies any 

signfiicant foot pain.  





- Objective


Vital Signs & Weight: 


 Vital Signs (12 hours)











  Temp Pulse Resp BP BP Pulse Ox


 


 08/14/19 08:17   77   136/79  


 


 08/14/19 07:37  98.9 F  77  16   136/79  96








 Weight











Admit Weight                   294 lb 1.6 oz


 


Weight                         294 lb 1.6 oz














I&O: 


 











 08/13/19 08/14/19 08/15/19





 06:59 06:59 06:59


 


Intake Total 1890 1010 


 


Balance 1890 1010 











Result Diagrams: 


 08/13/19 07:47





 08/14/19 08:49


Additional Labs: 


 Accuchecks











  08/14/19 08/13/19 08/13/19





  05:50 20:16 16:33


 


POC Glucose  138 H  194 H  194 H














  08/13/19





  11:21


 


POC Glucose  194 H














ROS





- Medication


Medications: 


Active Medications











Generic Name Dose Route Start Last Admin





  Trade Name Freq  PRN Reason Stop Dose Admin


 


Acetaminophen  650 mg  08/11/19 12:31  08/12/19 11:29





  Tylenol  PO   650 mg





  Q4H PRN   Administration





  Headache/Fever/Mild Pain (1-3)   





     





     





     


 


Enoxaparin Sodium  40 mg  08/12/19 09:00  08/14/19 08:18





  Lovenox  SC   40 mg





  0900 ALVAREZ   Administration





     





     





     





     


 


Glipizide  10 mg  08/12/19 07:30  08/14/19 08:17





  Glucotrol  PO   10 mg





  BID-AC ALVAREZ   Administration





     





     





     





     


 


Piperacillin Sod/Tazobactam  100 mls @ 200 mls/hr  08/11/19 18:00  08/14/19 05:

14





  Sod 3.375 gm/ Sodium Chloride  IVPB   100 mls





  0600,1200,1800,2359 ALVAREZ   Administration





     





     





     





     


 


Insulin Glargine 30 units/  0.3 mls @ 0 mls/hr  08/13/19 09:00  08/14/19 08:27





  Miscellaneous Medication  SC   0.3 mls





  QAM ALVAREZ   Administration





     





     





     





     


 


Insulin Human Regular  0 units  08/11/19 12:31  08/13/19 17:45





  Humulin R  SC   2 unit





  .MODERATE SLIDING SC PRN   Administration





  Moderate Correctional Scale   





     





     





     


 


Insulin Human Regular  0 units  08/11/19 12:31  08/11/19 21:00





  Humulin R  SC   3 unit





  .BEDTIME SLIDING SC PRN   Administration





  Bedtime Correctional Scale   





     





     





     














- Exam


NAD


Heart: RRR, no murmur, no gallops, no rubs


Respiratory: CTAB, no wheezes, no rales, no ronchi


Gastrointestinal: soft, non-tender, non-distended, normal bowel sounds


Extremities: no cyanosis, no clubbing


Skin - other findings: foot bandaged with blood staining medially


Psychiatric: normal affect





Hosp A/P


(1) Foot ulcer


Code(s): L97.509 - NON-PRESSURE CHRONIC ULCER OTH PRT UNSP FOOT W UNSP SEVERITY

   Status: Acute   


Qualifiers: 


   Laterality: right   Non-pressure ulcer stage: unspecified non-pressure ulcer 

stage   Qualified Code(s): L97.519 - Non-pressure chronic ulcer of other part 

of right foot with unspecified severity   





(2) Diabetes mellitus


Code(s): E11.9 - TYPE 2 DIABETES MELLITUS WITHOUT COMPLICATIONS   Status: 

Chronic   


Qualifiers: 


   Diabetes mellitus type: type 2   Diabetes mellitus long term insulin use: 

with long term use   Diabetes mellitus complication status: with kidney 

complications   Chronic kidney disease stage: stage 2 (mild) 





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


Code(s): E11.22 - TYPE 2 DIABETES MELLITUS W DIABETIC CHRONIC KIDNEY DISEASE; 

N18.2 - CHRONIC KIDNEY DISEASE, STAGE 2 (MILD)   Status: Chronic   





(4) Hypertension


Code(s): I10 - ESSENTIAL (PRIMARY) HYPERTENSION   Status: Chronic   


Qualifiers: 


   Hypertension type: essential hypertension   Qualified Code(s): I10 - 

Essential (primary) hypertension   





(5) Obesity


Code(s): E66.9 - OBESITY, UNSPECIFIED   Status: Chronic   


Qualifiers: 


   Body mass index: BMI 50.0-59.9 





(6) Charcot's joint


Code(s): M14.60 - CHARCOT'S JOINT, UNSPECIFIED SITE   Status: Chronic   





(7) Anemia


Code(s): D64.9 - ANEMIA, UNSPECIFIED   Status: Chronic   


Qualifiers: 


   Anemia type: unspecified type   Qualified Code(s): D64.9 - Anemia, 

unspecified   





(8) KATTY (acute kidney injury)


Code(s): N17.9 - ACUTE KIDNEY FAILURE, UNSPECIFIED   Status: Acute   





- Plan





Foot ulcer with medial foot cellulitis in the context of Charcot foot


- continue Zosyn - Vanc d/c yesterday


- Appreciate ID and Podiatry consults - s/p I&D and culture obtained without 

growth so far


- MRI complete - cannot rule in or out osteomyelitis





KATTY - d/c metformin and lisinopril, gentle IVF, obtain urine studies and renal 

US.  If worsening - consider Nephrology consult. 





DM -controlled - continue with once daily lantus - add BID/home dosing as blood 

sugars increase





dvt prophy - ambulatory - will d/c lovenox


gi prophy - not indicated


code status full





anticipate pt will be here another few days for determination of antibiotics, 

monitoring of renal function.  Reviewed plan of care wiht patient, no questions 

or further needs at end of eval

## 2019-08-14 NOTE — ULT
Exam: Bilateral renal ultrasound



HISTORY: Acute kidney insufficiency



COMPARISON: None





FINDINGS: 

Right kidney: Right renal cortical thinning. No obvious masses. No hydronephrosis.

Right kidney measurements: 5.5 x 10.7 x 5.6 cm. 

Left kidney: Normal cortical echotexture. No hydronephrosis

Left kidney measurements 6.0 x 11.4 x 5.1 cm. 



Urinary bladder: Normal mucosa. Bladder volume is 125 mL





IMPRESSION: No hydronephrosis.



Reported By: Tamela Angel 

Electronically Signed:  8/14/2019 12:43 PM

## 2019-08-14 NOTE — PRG
DATE OF SERVICE:  08/14/2019



SUBJECTIVE:  The patient was seen at bedside today, in no acute distress.  She is

one day status post incision and drainage of simple abscess to the right medial

ankle.  Denies any nausea, vomiting, fevers, or chills.  No pain to the foot. 



OBJECTIVE:  Dressing is clean, dry, and intact.  The packing was removed.  The wound

on the medial ankle unchanged in size, granular wound base.  Decreased periwound

erythema in both thighs and intensity of the redness.  No purulent drainage noted

within the wound at this time.  Plantar medial arch ulceration maintained and

unchanged in size.  No signs of infection to this wound. 



ASSESSMENT:  

1. One day status post incision and drainage of simple skin abscess to the right

medial ankle. 

2. Cellulitis, resolving.

3. Plantar ulceration, unchanged in status.

4. Diabetes with Charcot neuroarthropathy.



PLAN:  

1. Dressing was changed today, repacked with iodoform gauze and dry gauze dressing

applied.  I going to have Wound Care followed this and to continue with packing of

this wound and wet-to-dry on the plantar medial arch wound. 

2. Cellulitis, resolving.  Once we have cultures and sensitivities, we can direct

her oral antibiotic care.  I appreciate Dr. Espino' input on this matter. 

3. No further intervention required at this time.  The patient should follow up with

the wound care clinic on discharge and with myself or Dr. Carroll in our office once

she is discharged. 







Job ID:  [id number]

## 2019-08-15 LAB
ANION GAP SERPL CALC-SCNC: 11 MMOL/L (ref 10–20)
BUN SERPL-MCNC: 12 MG/DL (ref 7–18.7)
CALCIUM SERPL-MCNC: 8.5 MG/DL (ref 7.8–10.44)
CHLORIDE SERPL-SCNC: 108 MMOL/L (ref 98–107)
CO2 SERPL-SCNC: 24 MMOL/L (ref 22–29)
CREAT CL PREDICTED SERPL C-G-VRATE: 134 ML/MIN (ref 70–130)
GLUCOSE SERPL-MCNC: 81 MG/DL (ref 70–105)
POTASSIUM SERPL-SCNC: 3.4 MMOL/L (ref 3.5–5.1)
SODIUM SERPL-SCNC: 140 MMOL/L (ref 136–145)

## 2019-08-15 RX ADMIN — INSULIN GLARGINE SCH MLS: 100 INJECTION, SOLUTION SUBCUTANEOUS at 08:29

## 2019-08-15 RX ADMIN — INSULIN HUMAN PRN UNIT: 100 INJECTION, SOLUTION PARENTERAL at 17:37

## 2019-08-15 RX ADMIN — Medication PRN ML: at 17:37

## 2019-08-15 NOTE — PRG
DATE OF SERVICE:  08/15/2019



SUBJECTIVE:  Ms. Treadwell was seen by Dr. Owens and he did a limited debridement

of the medial aspect of the protruding area of collapsing hindfoot bone.  She is

otherwise feeling okay.  No pain.  No respiratory symptoms or abdominal pain.  No

diarrhea. 



OBJECTIVE:  VITAL SIGNS:  Normal except for elevation of systolic blood pressure.

Awake and oriented. 

LUNGS:  Clear. 

HEART:  S1 and S2, regular rate. 

ABDOMEN:  Soft, not distended. 

EXTREMITIES:  Right foot medial aspect with a little small area, which is packed

after debridement. 



LABORATORY DATA:  The cultures from that sample obtained yesterday are thus far

negative.  Moderate WBCs were seen though.  Blood cultures with bacillus which is

likely contaminant. 



ASSESSMENT AND DISCUSSION:  Chronic ulcer bottom aspect of the right foot with

development of inflammatory changes and blister, probably from the mechanical

friction within the boot that the patient is wearing in the setting of Charcot

arthropathy.  We will order a Ceretec study to see if there is a chance of

osteomyelitis.  She does have this port of entry from the mid-foot ulceration, and

we need to make sure that she does not have osteo in that area.  If there is uptake

in the Ceretec study, then we will have to treat as if she did have osteo. 







Job ID:  [id number]

## 2019-08-15 NOTE — PDOC.HOSPP
- Subjective


Encounter Date: 08/15/19


Encounter Time: 08:30


Subjective: 





Patient seen and examined. No new complaints. No overnight events





- Objective


Vital Signs & Weight: 


 Vital Signs (12 hours)











  Temp Pulse Resp BP Pulse Ox


 


 08/15/19 11:42  98.2 F  79  20  172/78 H  97


 


 08/15/19 08:00      97


 


 08/15/19 07:17  98.1 F  70  18  148/64 H  97








 Weight











Admit Weight                   294 lb 1.6 oz


 


Weight                         294 lb 1.6 oz














I&O: 


 











 08/14/19 08/15/19 08/16/19





 06:59 06:59 06:59


 


Intake Total 1010 1100 


 


Balance 1010 1100 











Result Diagrams: 


 08/13/19 07:47





 08/15/19 05:49


Additional Labs: 


 Accuchecks











  08/15/19 08/15/19 08/14/19





  11:57 05:18 20:32


 


POC Glucose  145 H  84  113 H














  08/14/19





  16:38


 


POC Glucose  147 H














ROS





- Review of Systems


Constitutional: denies: fever, chills, sweats, weakness, malaise, other


Eyes: denies: pain, vision change, conjunctivae inflammation, eyelid 

inflammation, redness, other


ENT: denies: ear pain, ear discharge, nose pain, nose discharge, nose congestion

, mouth pain, mouth swelling, throat pain, throat swelling, other


Respiratory: denies: cough, dry, shortness of breath, hemoptysis, SOB with 

excertion, pleuritic pain, sputum, wheezing, other


Cardiovascular: denies: chest pain, palpitations, orthopnea, paroxysmal noc. 

dyspnea, edema, light headedness, other


Gastrointestinal: denies: nausea, vomitting, abdominal pain, diarrhea, 

constipation, melena, hematochezia, other


Genitourinary: denies: dysuria, frequency, incontinence, hematuria, retention, 

other


Musculoskeletal: denies: neck pain, shoulder pain, arm pain, back pain, hand 

pain, leg pain, foot pain, other


Skin: denies: rash, lesions, gabrielle, bruising, other





- Medication


Medications: 


Active Medications











Generic Name Dose Route Start Last Admin





  Trade Name Freq  PRN Reason Stop Dose Admin


 


Acetaminophen  650 mg  08/11/19 12:31  08/12/19 11:29





  Tylenol  PO   650 mg





  Q4H PRN   Administration





  Headache/Fever/Mild Pain (1-3)   





     





     





     


 


Enoxaparin Sodium  40 mg  08/12/19 09:00  08/15/19 08:28





  Lovenox  SC   40 mg





  0900 ALVAREZ   Administration





     





     





     





     


 


Glipizide  10 mg  08/12/19 07:30  08/15/19 07:42





  Glucotrol  PO   10 mg





  BID-AC ALVAREZ   Administration





     





     





     





     


 


Piperacillin Sod/Tazobactam  100 mls @ 200 mls/hr  08/11/19 18:00  08/15/19 11:

27





  Sod 3.375 gm/ Sodium Chloride  IVPB   100 mls





  0600,1200,1800,2359 ALVAREZ   Administration





     





     





     





     


 


Insulin Glargine 30 units/  0.3 mls @ 0 mls/hr  08/13/19 09:00  08/15/19 08:29





  Miscellaneous Medication  SC   0.3 mls





  QAM ALVAREZ   Administration





     





     





     





     


 


Insulin Human Regular  0 units  08/11/19 12:31  08/13/19 17:45





  Humulin R  SC   2 unit





  .MODERATE SLIDING SC PRN   Administration





  Moderate Correctional Scale   





     





     





     


 


Insulin Human Regular  0 units  08/11/19 12:31  08/11/19 21:00





  Humulin R  SC   3 unit





  .BEDTIME SLIDING SC PRN   Administration





  Bedtime Correctional Scale   





     





     





     














- Exam


NAD, awake alert


Eye: PERRL, anicteric sclera


ENT: normocephalic atraumatic, no oropharyngeal lesions


Neck: supple, symmetric, no JVD


Heart: RRR, no murmur, no gallops, no rubs


Respiratory: CTAB, no wheezes, no rales, no ronchi


Gastrointestinal: soft, non-tender, non-distended, normal bowel sounds


Extremities: no cyanosis, no clubbing


Extremeties - other findings: wound with dressing


Skin: normal turgor, no lesions


Neurological: CN's grossly intact, normal sensation to touch, no new deficit


Musculoskeletal: normal tone, normal strength, no muscle wasting


Psychiatric: normal affect, normal behavior, A&O x 3





Hosp A/P


(1) Foot ulcer


Code(s): L97.509 - NON-PRESSURE CHRONIC ULCER OTH PRT UNSP FOOT W UNSP SEVERITY

   Status: Acute   


Qualifiers: 


   Laterality: right   Non-pressure ulcer stage: unspecified non-pressure ulcer 

stage   Qualified Code(s): L97.519 - Non-pressure chronic ulcer of other part 

of right foot with unspecified severity   


Plan: 


with right ankle skin ulcer and cellulitis, s/p I & D








(2) Anemia


Code(s): D64.9 - ANEMIA, UNSPECIFIED   Status: Chronic   


Qualifiers: 


   Anemia type: unspecified type   Qualified Code(s): D64.9 - Anemia, 

unspecified   





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


Code(s): E11.22 - TYPE 2 DIABETES MELLITUS W DIABETIC CHRONIC KIDNEY DISEASE; 

N18.2 - CHRONIC KIDNEY DISEASE, STAGE 2 (MILD)   Status: Chronic   





(4) Diabetes mellitus


Code(s): E11.9 - TYPE 2 DIABETES MELLITUS WITHOUT COMPLICATIONS   Status: 

Chronic   


Qualifiers: 


   Diabetes mellitus type: type 2   Diabetes mellitus long term insulin use: 

with long term use   Diabetes mellitus complication status: with kidney 

complications   Chronic kidney disease stage: stage 2 (mild) 





(5) Hypertension


Code(s): I10 - ESSENTIAL (PRIMARY) HYPERTENSION   Status: Chronic   


Qualifiers: 


   Hypertension type: essential hypertension   Qualified Code(s): I10 - 

Essential (primary) hypertension   





(6) Obesity


Code(s): E66.9 - OBESITY, UNSPECIFIED   Status: Chronic   


Qualifiers: 


   Body mass index: BMI 50.0-59.9 





(7) Charcot's joint


Code(s): M14.60 - CHARCOT'S JOINT, UNSPECIFIED SITE   Status: Chronic   





- Plan


old records reviewed/req, continue antibiotics





DC IVF


follow on culture result


continue zosyn


medication reviewed as above


symptomatic treatment


wound care

## 2019-08-16 RX ADMIN — INSULIN HUMAN PRN UNIT: 100 INJECTION, SOLUTION PARENTERAL at 11:30

## 2019-08-16 RX ADMIN — INSULIN GLARGINE SCH MLS: 100 INJECTION, SOLUTION SUBCUTANEOUS at 09:03

## 2019-08-16 NOTE — PDOC.HOSPP
- Subjective


Encounter Date: 08/16/19


Encounter Time: 09:45


Subjective: 





no pain, is amb in room





- Objective


Vital Signs & Weight: 


 Vital Signs (12 hours)











  Temp Pulse Resp BP Pulse Ox


 


 08/16/19 11:30  98.1 F  74  16  146/79 H  98


 


 08/16/19 08:00      98








 Weight











Admit Weight                   294 lb 1.6 oz


 


Weight                         294 lb 1.6 oz














I&O: 


 











 08/15/19 08/16/19 08/17/19





 06:59 06:59 06:59


 


Intake Total 1100 1500 


 


Balance 1100 1500 











Result Diagrams: 


 08/13/19 07:47





 08/15/19 05:49


Additional Labs: 


 Accuchecks











  08/16/19 08/16/19 08/15/19





  11:33 05:34 20:54


 


POC Glucose  205 H  141 H  197 H














  08/15/19





  16:58


 


POC Glucose  194 H














ROS





- Medication


Medications: 


Active Medications











Generic Name Dose Route Start Last Admin





  Trade Name Freq  PRN Reason Stop Dose Admin


 


Acetaminophen  650 mg  08/11/19 12:31  08/12/19 11:29





  Tylenol  PO   650 mg





  Q4H PRN   Administration





  Headache/Fever/Mild Pain (1-3)   





     





     





     


 


Enoxaparin Sodium  40 mg  08/12/19 09:00  08/16/19 09:02





  Lovenox  SC   40 mg





  0900 ALVAREZ   Administration





     





     





     





     


 


Glipizide  10 mg  08/12/19 07:30  08/16/19 09:02





  Glucotrol  PO   10 mg





  BID-AC ALVAREZ   Administration





     





     





     





     


 


Piperacillin Sod/Tazobactam  100 mls @ 200 mls/hr  08/11/19 18:00  08/16/19 11:

24





  Sod 3.375 gm/ Sodium Chloride  IVPB   100 mls





  0600,1200,1800,2359 ALVAREZ   Administration





     





     





     





     


 


Insulin Glargine 30 units/  0.3 mls @ 0 mls/hr  08/13/19 09:00  08/16/19 09:03





  Miscellaneous Medication  SC   0.3 mls





  QAM ALVAREZ   Administration





     





     





     





     


 


Insulin Human Regular  0 units  08/11/19 12:31  08/16/19 11:30





  Humulin R  SC   4 unit





  .MODERATE SLIDING SC PRN   Administration





  Moderate Correctional Scale   





     





     





     


 


Insulin Human Regular  0 units  08/11/19 12:31  08/11/19 21:00





  Humulin R  SC   3 unit





  .BEDTIME SLIDING SC PRN   Administration





  Bedtime Correctional Scale   





     





     





     


 


Sodium Chloride  10 ml  08/11/19 12:31  08/15/19 17:37





  Flush - Normal Saline  IVF   10 ml





  PRN PRN   Administration





  Saline Flush   





     





     





     














- Exam


NAD, awake alert


Eye: PERRL, anicteric sclera


ENT: normocephalic atraumatic, no oropharyngeal lesions


Neck: supple, no JVD


Heart: RRR, no murmur


Respiratory: no wheezes, no rales


Gastrointestinal: soft, non-tender, normal bowel sounds


Extremities: no clubbing, 1+ LE edema


Extremeties - other findings: right LE edema+, foot in dressing


Neurological: CN's grossly intact, no focal deficits


Psychiatric: normal affect, A&O x 3





Hosp A/P


(1) Foot ulcer


Code(s): L97.509 - NON-PRESSURE CHRONIC ULCER OTH PRT UNSP FOOT W UNSP SEVERITY

   Status: Acute   


Qualifiers: 


   Laterality: right   Non-pressure ulcer stage: unspecified non-pressure ulcer 

stage   Qualified Code(s): L97.519 - Non-pressure chronic ulcer of other part 

of right foot with unspecified severity   





(2) Anemia


Code(s): D64.9 - ANEMIA, UNSPECIFIED   Status: Chronic   


Qualifiers: 


   Anemia type: unspecified type   Qualified Code(s): D64.9 - Anemia, 

unspecified   





(3) Charcot's joint


Code(s): M14.60 - CHARCOT'S JOINT, UNSPECIFIED SITE   Status: Chronic   





(4) Diabetes mellitus


Code(s): E11.9 - TYPE 2 DIABETES MELLITUS WITHOUT COMPLICATIONS   Status: 

Chronic   


Qualifiers: 


   Diabetes mellitus type: type 2   Diabetes mellitus long term insulin use: 

with long term use   Diabetes mellitus complication status: with kidney 

complications   Chronic kidney disease stage: stage 2 (mild) 





(5) Hypertension


Code(s): I10 - ESSENTIAL (PRIMARY) HYPERTENSION   Status: Chronic   


Qualifiers: 


   Hypertension type: essential hypertension   Qualified Code(s): I10 - 

Essential (primary) hypertension   





(6) Obesity


Code(s): E66.9 - OBESITY, UNSPECIFIED   Status: Chronic   


Qualifiers: 


   Body mass index: BMI 50.0-59.9 





- Plan





is on zosyn, glipizide, lantus


await ceretec study to r/o osteo


hemostable

## 2019-08-17 RX ADMIN — INSULIN GLARGINE SCH MLS: 100 INJECTION, SOLUTION SUBCUTANEOUS at 07:52

## 2019-08-17 RX ADMIN — INSULIN HUMAN PRN UNIT: 100 INJECTION, SOLUTION PARENTERAL at 11:44

## 2019-08-17 RX ADMIN — INSULIN HUMAN PRN UNIT: 100 INJECTION, SOLUTION PARENTERAL at 20:39

## 2019-08-17 RX ADMIN — INSULIN HUMAN PRN UNIT: 100 INJECTION, SOLUTION PARENTERAL at 16:34

## 2019-08-17 NOTE — PDOC.HOSPP
- Subjective


Encounter Date: 08/17/19


Encounter Time: 08:45


Subjective: 





is sitting in chair, no pain, amb in room





- Objective


Vital Signs & Weight: 


 Vital Signs (12 hours)











  Temp Pulse Resp BP Pulse Ox


 


 08/17/19 08:00      100


 


 08/17/19 07:49  98.1 F  74  16  163/91 H  99








 Weight











Admit Weight                   294 lb 1.6 oz


 


Weight                         294 lb 1.6 oz














I&O: 


 











 08/16/19 08/17/19 08/18/19





 06:59 06:59 06:59


 


Intake Total 1500 1500 


 


Balance 1500 1500 











Result Diagrams: 


 08/13/19 07:47





 08/15/19 05:49


Additional Labs: 


 Accuchecks











  08/17/19 08/17/19 08/16/19





  11:46 04:53 21:08


 


POC Glucose  218 H  126 H  218 H














  08/16/19





  18:21


 


POC Glucose  134 H














ROS





- Medication


Medications: 


Active Medications











Generic Name Dose Route Start Last Admin





  Trade Name Freq  PRN Reason Stop Dose Admin


 


Acetaminophen  650 mg  08/11/19 12:31  08/12/19 11:29





  Tylenol  PO   650 mg





  Q4H PRN   Administration





  Headache/Fever/Mild Pain (1-3)   





     





     





     


 


Enoxaparin Sodium  40 mg  08/12/19 09:00  08/17/19 07:53





  Lovenox  SC   40 mg





  0900 ALVAREZ   Administration





     





     





     





     


 


Glipizide  10 mg  08/12/19 07:30  08/17/19 07:53





  Glucotrol  PO   10 mg





  BID-AC ALVAREZ   Administration





     





     





     





     


 


Piperacillin Sod/Tazobactam  100 mls @ 200 mls/hr  08/11/19 18:00  08/17/19 11:

34





  Sod 3.375 gm/ Sodium Chloride  IVPB   100 mls





  0600,1200,1800,2359 ALVAREZ   Administration





     





     





     





     


 


Insulin Glargine 30 units/  0.3 mls @ 0 mls/hr  08/13/19 09:00  08/17/19 07:52





  Miscellaneous Medication  SC   0.3 mls





  QAM ALVAREZ   Administration





     





     





     





     


 


Insulin Human Regular  0 units  08/11/19 12:31  08/17/19 11:44





  Humulin R  SC   4 unit





  .MODERATE SLIDING SC PRN   Administration





  Moderate Correctional Scale   





     





     





     


 


Insulin Human Regular  0 units  08/11/19 12:31  08/11/19 21:00





  Humulin R  SC   3 unit





  .BEDTIME SLIDING SC PRN   Administration





  Bedtime Correctional Scale   





     





     





     


 


Sodium Chloride  10 ml  08/11/19 12:31  08/15/19 17:37





  Flush - Normal Saline  IVF   10 ml





  PRN PRN   Administration





  Saline Flush   





     





     





     














- Exam


NAD, awake alert


Eye: PERRL, anicteric sclera


ENT: no oropharyngeal lesions, moist mucosa


Neck: supple, no JVD


Heart: RRR, no murmur


Respiratory: no wheezes, no rales


Gastrointestinal: soft, non-tender, non-distended, normal bowel sounds


Extremities: 1+ LE edema


Neurological: CN's grossly intact, no focal deficits


Psychiatric: normal affect, A&O x 3





Hosp A/P


(1) Foot ulcer


Code(s): L97.509 - NON-PRESSURE CHRONIC ULCER OTH PRT UNSP FOOT W UNSP SEVERITY

   Status: Acute   


Qualifiers: 


   Laterality: right   Non-pressure ulcer stage: unspecified non-pressure ulcer 

stage   Qualified Code(s): L97.519 - Non-pressure chronic ulcer of other part 

of right foot with unspecified severity   





(2) Anemia


Code(s): D64.9 - ANEMIA, UNSPECIFIED   Status: Chronic   


Qualifiers: 


   Anemia type: unspecified type   Qualified Code(s): D64.9 - Anemia, 

unspecified   





(3) Charcot's joint


Code(s): M14.60 - CHARCOT'S JOINT, UNSPECIFIED SITE   Status: Chronic   





(4) Diabetes mellitus


Code(s): E11.9 - TYPE 2 DIABETES MELLITUS WITHOUT COMPLICATIONS   Status: 

Chronic   


Qualifiers: 


   Diabetes mellitus type: type 2   Diabetes mellitus long term insulin use: 

with long term use   Diabetes mellitus complication status: with kidney 

complications   Chronic kidney disease stage: stage 2 (mild) 





(5) Hypertension


Code(s): I10 - ESSENTIAL (PRIMARY) HYPERTENSION   Status: Chronic   


Qualifiers: 


   Hypertension type: essential hypertension   Qualified Code(s): I10 - 

Essential (primary) hypertension   





(6) Obesity


Code(s): E66.9 - OBESITY, UNSPECIFIED   Status: Chronic   


Qualifiers: 


   Body mass index: BMI 50.0-59.9 





- Plan





is on zosyn, glipizide, lantus


ceretec study shows no evidence of osteo


hemostable


outpt antibiotics per  adv, wound cs have no growth

## 2019-08-17 NOTE — NM
RADIONUCLIDE TECHNETIUM 99M HMPAO LABELED WBC SCAN WITH WHOLE BODY PLANAR IMAGING AND SPECT CT OF THE
 FEET AND ANKLES:

 

HISTORY:

Chronic nonhealing open wound on the planta surface of the right foot.

 

RADIOPHARMACEUTICAL:

Technetium 99m HMPAO labeled WBCs, 16 millicuries, injected intravenously.

 

FINDINGS:

There is physiologic activity in the liver, spleen, and bone marrow.

 

There is a focal area of increased uptake in the soft tissues of the medial right ankle and at the me
dial aspect of the plantar region of the proximal right foot.  No bony involvement is seen.

 

IMPRESSION:

1.  No evidence of osteomyelitis.

 

2.  Soft tissue infection in the right medial foot and ankle.

 

POS: ALYSSA

## 2019-08-18 VITALS — DIASTOLIC BLOOD PRESSURE: 85 MMHG | TEMPERATURE: 97.8 F | SYSTOLIC BLOOD PRESSURE: 150 MMHG

## 2019-08-18 RX ADMIN — INSULIN HUMAN PRN UNIT: 100 INJECTION, SOLUTION PARENTERAL at 11:47

## 2019-08-18 RX ADMIN — Medication PRN ML: at 11:50

## 2019-08-18 RX ADMIN — INSULIN GLARGINE SCH MLS: 100 INJECTION, SOLUTION SUBCUTANEOUS at 09:12

## 2019-08-18 NOTE — PDOC.HOSPP
- Subjective


Encounter Date: 08/18/19


Encounter Time: 09:10


Subjective: 





feels better, no new complaints





- Objective


Vital Signs & Weight: 


 Vital Signs (12 hours)











  Temp Pulse Resp BP Pulse Ox


 


 08/18/19 09:18      97


 


 08/18/19 09:08  98.2 F  71  16  152/80 H  97








 Weight











Admit Weight                   294 lb 1.6 oz


 


Weight                         294 lb 1.6 oz














I&O: 


 











 08/17/19 08/18/19 08/19/19





 06:59 06:59 06:59


 


Intake Total 1500 2200 


 


Balance 1500 2200 











Result Diagrams: 


 08/13/19 07:47





 08/15/19 05:49


Additional Labs: 


 Accuchecks











  08/18/19 08/18/19 08/17/19





  11:28 05:19 20:31


 


POC Glucose  194 H  94  220 H














  08/17/19





  16:33


 


POC Glucose  232 H














ROS





- Medication


Medications: 


Active Medications











Generic Name Dose Route Start Last Admin





  Trade Name Freq  PRN Reason Stop Dose Admin


 


Acetaminophen  650 mg  08/11/19 12:31  08/18/19 09:12





  Tylenol  PO   650 mg





  Q4H PRN   Administration





  Headache/Fever/Mild Pain (1-3)   





     





     





     


 


Enoxaparin Sodium  40 mg  08/12/19 09:00  08/18/19 09:12





  Lovenox  SC   40 mg





  0900 ALVAREZ   Administration





     





     





     





     


 


Glipizide  10 mg  08/12/19 07:30  08/18/19 09:12





  Glucotrol  PO   10 mg





  BID-AC ALVAREZ   Administration





     





     





     





     


 


Piperacillin Sod/Tazobactam  100 mls @ 200 mls/hr  08/11/19 18:00  08/18/19 11:

46





  Sod 3.375 gm/ Sodium Chloride  IVPB   100 mls





  0600,1200,1800,2359 ALVAREZ   Administration





     





     





     





     


 


Insulin Glargine 30 units/  0.3 mls @ 0 mls/hr  08/13/19 09:00  08/18/19 09:12





  Miscellaneous Medication  SC   0.3 mls





  QAM ALVAREZ   Administration





     





     





     





     


 


Insulin Human Regular  0 units  08/11/19 12:31  08/18/19 11:47





  Humulin R  SC   2 unit





  .MODERATE SLIDING SC PRN   Administration





  Moderate Correctional Scale   





     





     





     


 


Insulin Human Regular  0 units  08/11/19 12:31  08/17/19 20:39





  Humulin R  SC   2 unit





  .BEDTIME SLIDING SC PRN   Administration





  Bedtime Correctional Scale   





     





     





     


 


Sodium Chloride  10 ml  08/11/19 12:31  08/18/19 11:50





  Flush - Normal Saline  IVF   10 ml





  PRN PRN   Administration





  Saline Flush   





     





     





     














- Exam


NAD, awake alert


Eye: PERRL, anicteric sclera


ENT: normocephalic atraumatic, moist mucosa


Neck: supple, no JVD


Heart: RRR, no gallops


Respiratory: no wheezes, no rales


Gastrointestinal: soft, non-tender, normal bowel sounds


Extremities: 1+ LE edema


Extremeties - other findings: right ankle area in dressing


Neurological: CN's grossly intact, no focal deficits


Psychiatric: normal affect, A&O x 3





Hosp A/P


(1) Foot ulcer


Code(s): L97.509 - NON-PRESSURE CHRONIC ULCER OTH PRT UNSP FOOT W UNSP SEVERITY

   Status: Acute   


Qualifiers: 


   Laterality: right   Non-pressure ulcer stage: unspecified non-pressure ulcer 

stage   Qualified Code(s): L97.519 - Non-pressure chronic ulcer of other part 

of right foot with unspecified severity   





(2) Anemia


Code(s): D64.9 - ANEMIA, UNSPECIFIED   Status: Chronic   


Qualifiers: 


   Anemia type: unspecified type   Qualified Code(s): D64.9 - Anemia, 

unspecified   





(3) Charcot's joint


Code(s): M14.60 - CHARCOT'S JOINT, UNSPECIFIED SITE   Status: Chronic   





(4) Diabetes mellitus


Code(s): E11.9 - TYPE 2 DIABETES MELLITUS WITHOUT COMPLICATIONS   Status: 

Chronic   


Qualifiers: 


   Diabetes mellitus type: type 2   Diabetes mellitus long term insulin use: 

with long term use   Diabetes mellitus complication status: with kidney 

complications   Chronic kidney disease stage: stage 2 (mild) 





(5) Hypertension


Code(s): I10 - ESSENTIAL (PRIMARY) HYPERTENSION   Status: Chronic   


Qualifiers: 


   Hypertension type: essential hypertension   Qualified Code(s): I10 - 

Essential (primary) hypertension   





(6) Obesity


Code(s): E66.9 - OBESITY, UNSPECIFIED   Status: Chronic   


Qualifiers: 


   Body mass index: BMI 50.0-59.9 





- Plan





continue glipizide, lantus


ceretec study shows no evidence of osteo


hemostable


D/w , to start doxy and continue x 2 weeks

## 2019-08-18 NOTE — DIS
DATE OF ADMISSION:  08/11/2019



DATE OF DISCHARGE:  08/18/2019



DISCHARGE DISPOSITION:  To home.



PRIMARY DISCHARGE DIAGNOSES:  Right foot abscess/ulcer with Charcot joint and 
diabetes.



SECONDARY DISCHARGE DIAGNOSES:  Chronic anemia; Charcot joint, right foot; 
diabetes

mellitus type 2; hypertension; obesity. 



PROCEDURES DONE DURING HOSPITALIZATION:  The patient has had plain x-ray of the

right foot done shows collapse of the midfoot with osseous irregularity and

displacement of the anterior process of the talus with respect to articulation 
with

the navicular bone with pes planus deformity.  Findings are likely related to

Charcot-type joint.  Diffuse subcutaneous soft tissue swelling and focus of gas 
in

the plantar subcutaneous soft tissue, likely related to the patient's known 
wound in

this region.  MRI right foot showed diffuse hindfoot and midfoot signal 
abnormality

with associated talonavicular dislocation.  This could be seen in a neuropathic

joint, osteomyelitis, and tibiotalar septic arthritis could not be excluded.  
Medial

flexor and peroneal tenosynovial fluid may reflect infectious tenosynovitis.  
White

blood cell nuclear medicine scan shows no evidence of osteomyelitis.  There is

soft-tissue infection in the right medial foot and ankle.  Renal ultrasound 
done on

08/14/2019 shows no evidence of hydronephrosis.  Wound culture from the right 
ankle

area has not revealed any organism.  White count of 7.9, H and H is 10 and 29,

platelet count 327, MCV is 83 with 69% neutrophils.  BUN 12, creatinine 1.0 on 
the

15th.  Serum bicarb of 24, HbA1c 9.6. 



INPATIENT CONSULT:  Dr. Angelito Owens, for Podiatry, Dr. Espino for Infectious

Disease. 



DISCHARGE MEDICATION:  

1. Doxycycline 100 mg p.o. twice daily for 2 weeks per Dr. Espino' advice.

2. Metformin 1000 mg twice daily.

3. Lisinopril 5 mg daily.

4. Levemir 30 units subcu twice daily.

5. Glipizide 10 mg twice daily.



ALLERGIES:  ALLERGIC TO LATEX.



DISCHARGE PLAN:  The patient to follow up with Dr. Carroll, her podiatrist in 1 
week,

Dr. Espino in 10 days, and primary care physician in 1 week. 



BRIEF COURSE DURING HOSPITALIZATION:  The patient initially was admitted for 
right

foot and leg swelling and ulcerations with history of Charcot joint and 
diabetes.

She has had multiple workup done to rule out osteomyelitis.  All of the tests 
done

have not revealed any signs of osteomyelitis.  She was on broad-spectrum 
antibiotics

all through her stay here.  The patient had consultation with Dr. Owens, for

Podiatry and had bedside evaluation of her wound with debridement done.  She has

been switched over to doxycycline 100 mg twice daily for a period of 2 weeks.  
Her

cellulitis of the right foot is also resolving.  She is hemodynamically stable 
and

will be shortly discharged home.  Please see a face-to-face documentation for 
the

day of discharge on [place]. 







Job ID:  [id number]



MTDD

## 2019-09-05 ENCOUNTER — HOSPITAL ENCOUNTER (EMERGENCY)
Dept: HOSPITAL 92 - ERS | Age: 49
Discharge: HOME | End: 2019-09-05
Payer: SELF-PAY

## 2019-09-05 DIAGNOSIS — L97.519: ICD-10-CM

## 2019-09-05 DIAGNOSIS — Z79.899: ICD-10-CM

## 2019-09-05 DIAGNOSIS — E11.9: ICD-10-CM

## 2019-09-05 DIAGNOSIS — Z79.84: ICD-10-CM

## 2019-09-05 DIAGNOSIS — F17.200: ICD-10-CM

## 2019-09-05 DIAGNOSIS — E11.621: Primary | ICD-10-CM

## 2019-09-05 LAB
ALBUMIN SERPL BCG-MCNC: 3.2 G/DL (ref 3.5–5)
ALP SERPL-CCNC: 76 U/L (ref 40–150)
ALT SERPL W P-5'-P-CCNC: 7 U/L (ref 8–55)
ANION GAP SERPL CALC-SCNC: 10 MMOL/L (ref 10–20)
AST SERPL-CCNC: 9 U/L (ref 5–34)
BASOPHILS # BLD AUTO: 0 THOU/UL (ref 0–0.2)
BASOPHILS NFR BLD AUTO: 0.1 % (ref 0–1)
BILIRUB SERPL-MCNC: 0.2 MG/DL (ref 0.2–1.2)
BUN SERPL-MCNC: 12 MG/DL (ref 7–18.7)
CALCIUM SERPL-MCNC: 8.7 MG/DL (ref 7.8–10.44)
CHLORIDE SERPL-SCNC: 103 MMOL/L (ref 98–107)
CO2 SERPL-SCNC: 28 MMOL/L (ref 22–29)
CREAT CL PREDICTED SERPL C-G-VRATE: 0 ML/MIN (ref 70–130)
EOSINOPHIL # BLD AUTO: 0.1 THOU/UL (ref 0–0.7)
EOSINOPHIL NFR BLD AUTO: 1.3 % (ref 0–10)
GLOBULIN SER CALC-MCNC: 3.7 G/DL (ref 2.4–3.5)
GLUCOSE SERPL-MCNC: 160 MG/DL (ref 70–105)
HGB BLD-MCNC: 9 G/DL (ref 12–16)
LYMPHOCYTES # BLD: 2.6 THOU/UL (ref 1.2–3.4)
LYMPHOCYTES NFR BLD AUTO: 29.1 % (ref 21–51)
MCH RBC QN AUTO: 26.7 PG (ref 27–31)
MCV RBC AUTO: 82.2 FL (ref 78–98)
MONOCYTES # BLD AUTO: 0.5 THOU/UL (ref 0.11–0.59)
MONOCYTES NFR BLD AUTO: 5.8 % (ref 0–10)
NEUTROPHILS # BLD AUTO: 5.7 THOU/UL (ref 1.4–6.5)
NEUTROPHILS NFR BLD AUTO: 63.7 % (ref 42–75)
PLATELET # BLD AUTO: 287 THOU/UL (ref 130–400)
POTASSIUM SERPL-SCNC: 3.8 MMOL/L (ref 3.5–5.1)
RBC # BLD AUTO: 3.38 MILL/UL (ref 4.2–5.4)
SODIUM SERPL-SCNC: 137 MMOL/L (ref 136–145)
WBC # BLD AUTO: 8.9 THOU/UL (ref 4.8–10.8)

## 2019-09-05 PROCEDURE — 80053 COMPREHEN METABOLIC PANEL: CPT

## 2019-09-05 PROCEDURE — 85025 COMPLETE CBC W/AUTO DIFF WBC: CPT

## 2019-09-05 PROCEDURE — 36415 COLL VENOUS BLD VENIPUNCTURE: CPT

## 2019-09-05 NOTE — RAD
EXAM: 3 views of the right foot



HISTORY: Right foot infection that began 4 months ago



COMPARISON: None



FINDINGS: 3 views of the right foot shows sclerosis of the talus and navicular bones. There is flatte
elizabeth of the foot. Osseous erosions are seen surrounding the bones of the midfoot and hindfoot.

These have not significantly changed compared to the prior examination. A wound containing air is see
n along the plantar aspect of the foot.



IMPRESSION: Stable destructive changes of the midfoot and hindfoot



Reported By: Teddy Honeycutt 

Electronically Signed:  9/5/2019 9:56 AM

## 2019-09-09 ENCOUNTER — HOSPITAL ENCOUNTER (OUTPATIENT)
Dept: HOSPITAL 92 - WCC | Age: 49
Discharge: HOME | End: 2019-09-09
Attending: FAMILY MEDICINE
Payer: SELF-PAY

## 2019-09-09 DIAGNOSIS — L97.419: ICD-10-CM

## 2019-09-09 DIAGNOSIS — I10: ICD-10-CM

## 2019-09-09 DIAGNOSIS — E11.621: Primary | ICD-10-CM

## 2019-09-09 DIAGNOSIS — E11.610: ICD-10-CM

## 2019-09-09 PROCEDURE — 36416 COLLJ CAPILLARY BLOOD SPEC: CPT

## 2019-09-09 PROCEDURE — 99203 OFFICE O/P NEW LOW 30 MIN: CPT

## 2019-09-09 PROCEDURE — G0463 HOSPITAL OUTPT CLINIC VISIT: HCPCS

## 2019-09-09 PROCEDURE — A4218 STERILE SALINE OR WATER: HCPCS

## 2019-09-09 PROCEDURE — 11042 DBRDMT SUBQ TIS 1ST 20SQCM/<: CPT

## 2019-09-09 NOTE — HP
HISTORY OF PRESENT ILLNESS:  Ms. Eleanor Treadwell is a very pleasant 49-year-old, who

presents [facility] for evaluation of an ulceration of the plantar surface

of the right midfoot.  The patient also has an ulceration over the right medial

ankle.  The patient states that she has been followed by Dr. Carroll for the

ulceration over the plantar surface of the right midfoot.  The patient's medical

history is significant for Charcot's arthropathy.  The patient states that she was

prescribed a boot, which may have led to the formation of an abscess over the right

medial ankle.  The patient states that she was admitted to [facility] on 08/11/2019 for treatment of erythema and edema of her right foot.

She states that during her hospital stay, the abscess of the right medial ankle was

opened at bedside by Dr. Owens in lieu of Dr. Carroll.  The patient states that

before her admission to [facility], she had been referred to

the [facility].  The patient states that previously the ulceration of

the plantar surface of the right midfoot has been treated with dressing changes of

Promogran. 



PAST MEDICAL HISTORY:  

1. Diabetes mellitus.

2. Hypertension.

3. Charcot's arthropathy.



PAST SURGICAL HISTORY:  

1. Laparoscopic cholecystectomy.

2. Bilateral tubal ligation.

3. Left great toe amputation.



MEDICATIONS:  

1. Levemir.

2. Glipizide.

3. Lisinopril.

4. Metformin.



ALLERGIES:  LATEX.



SOCIAL HISTORY:  Social history is significant for tobacco use of up to 4 to 5

cigarettes per day for 4 years.  The social history is negative for EtOH use. 



FAMILY HISTORY:  Family history is significant for diabetes mellitus.  The patient

states that her sister, mother, and brother were all diagnosed with diabetes

mellitus. 



PHYSICAL EXAMINATION:

VITAL SIGNS:  Temperature 97.9, pulse 83, respirations 16, and blood pressure

142/96, Accu-Chek 167. 

GENERAL:  A 49-year-old female sitting on table in examination room, in no acute

distress. 

HEENT:  Normocephalic and atraumatic. 

NECK:  No nuchal rigidity. 

CHEST:  Clear to auscultation. 

CV:  Regular rate and rhythm. 

ABDOMEN:  Soft. 

EXTREMITIES:  An ulceration over the plantar surface of the right mid foot is

present, which measures approximately 2.8 x 2.6 cm.  An ulceration over the right

medial ankle is also present, which measures approximately 1.5 x 0.9 cm.

Granulation tissue is present within the margins of each wound.  Nonviable tissue

present within the margins of each wound was debrided with an excisional

full-thickness debridement with the use of a curette and scissors.  Undermining and

desiccated tissue at the periphery of each wound were eliminated with the use of

scissors.  No purulent drainage is associated with either wound.  No erythema of the

skin surrounding either wound is present.  No maceration of the skin of the

periwound of either wound is noted.  A dorsalis pedis pulse and a posterior tibial

pulse are both palpable on the right.  Mild-to-moderate edema of the right foot is

present on exam today. 



ASSESSMENT AND PLAN:  

1. Ulcerations of right foot as described above.  Dressing changes of Xeroform gauze

followed by 4x4s, Kerlix, and an Ace bandage will be initiated today.  These

dressing changes are to be performed on a daily basis after cleansing and

irrigation.  No antibiotics will be prescribed today based upon the appearance of

the wounds.  I will see Ms. Treadwell again in 2 weeks.  The patient states that she

has crutches as well as a knee scooter.  I have encouraged her to offload the

plantar ulceration of the right foot to the best of her ability utilizing both her

knee scooter and crutches whenever possible.  The patient understands and is in

agreement with the preceding treatment plan.  She states she will return to clinic

in 2 weeks. 

2. Diabetes mellitus.  The patient's Accu-Chek in clinic today is 167.  The patient

has been told that for optimal wound healing her blood glucoses should remain below

150. 

3. Hypertension.

4. Charcot arthropathy.







Job ID:  [id number]

## 2019-09-23 ENCOUNTER — HOSPITAL ENCOUNTER (OUTPATIENT)
Dept: HOSPITAL 92 - WCC | Age: 49
Discharge: HOME | End: 2019-09-23
Attending: FAMILY MEDICINE
Payer: SELF-PAY

## 2019-09-23 DIAGNOSIS — E11.610: ICD-10-CM

## 2019-09-23 DIAGNOSIS — L97.419: ICD-10-CM

## 2019-09-23 DIAGNOSIS — I10: ICD-10-CM

## 2019-09-23 DIAGNOSIS — E11.621: Primary | ICD-10-CM

## 2019-09-23 PROCEDURE — 36416 COLLJ CAPILLARY BLOOD SPEC: CPT

## 2019-09-23 PROCEDURE — A4218 STERILE SALINE OR WATER: HCPCS

## 2019-09-23 PROCEDURE — 11042 DBRDMT SUBQ TIS 1ST 20SQCM/<: CPT

## 2019-09-23 NOTE — PRG
DATE OF SERVICE:  09/23/2019



SUBJECTIVE:  Ms. Eleanor Treadwell is a very pleasant 49-year-old, who presents to the

[facility] for evaluation of an ulceration of the plantar surface of the right

midfoot.  The patient also has an ulceration over the right medial ankle.  The

patient previously stated that she had been followed by Dr. Carroll for the ulceration

over the plantar surface of the right midfoot.  The patient's medical history

significant for Charcot arthropathy.  The patient stated that she was prescribed a

boot, which may have led to the formation of an abscess over the right medial ankle.

 The patient stated that she was admitted to [facility] on

08/11/2019 for treatment of erythema and edema of her right foot.  She stated that

during her hospital stay, the abscess of the right medial ankle was opened at

bedside by Dr. Owens in lieu of Dr. Carroll.  The patient stated that before her

admission to [facility], she had been referred to the [facility].  The patient stated that previously the ulceration of the

plantar surface of the right midfoot had been treated with dressing changes of

Promogran.  After being seen in the Wound Center, the patient was placed on dressing

changes of Xeroform gauze for both right foot ulcerations. 



OBJECTIVE:  VITAL SIGNS:  Temperature 98.3, pulse 86, respirations 20, and blood

pressure 130/66.  Accu-Chek 217. 

EXTREMITIES:  An ulceration over the plantar surface of the right mid foot is

present, which measures approximately 2.1 x 2.4 cm.  The dimensions of the wound at

the time of the patient's last visit were approximately 2.8 x 2.6 cm.  An ulceration

over the right medial ankle is present, which measures approximately 1.9 x 1.9 cm.

The dimensions of the wound at the time of the patient's last visit were

approximately 1.5 x 0.9 cm.  Granulation tissue is present within the margins of

each wound.  Nonviable tissue present within the margins of each wound was debrided

with an excisional full-thickness debridement with the use of scissors.  Undermining

and desiccated tissue at the periphery of the plantar wound were eliminated with the

use of scissors.  No purulent drainage is associated with either wound.  No erythema

of the skin surrounding either wound is present.  No maceration of the skin of the

periwound of either wound is noted. 



ASSESSMENT AND PLAN:  

1. Ulcerations of right foot as described above.  Dressing changes of Xeroform gauze

followed by 4x4s and Kerlix will be continued on a daily basis after cleansing and

irrigation.  The patient has been told she may utilize an ACE bandage as needed at

the time of dressing changes.  Again, the patient has been instructed as to the

importance of offloading and achieving the healing of her plantar ulceration.  I

will see Ms. Treadwell again in 2 weeks. 

2. Diabetes mellitus.  The patient's Accu-Chek in clinic today is 217.  The patient

has been told that for optimal wound healing, her blood glucoses should remain below

150. 

3. Hypertension.

4. Charcot arthropathy.







Job ID:  [id number]

## 2021-03-15 ENCOUNTER — HOSPITAL ENCOUNTER (OUTPATIENT)
Dept: HOSPITAL 92 - CSHWCC | Age: 51
Discharge: HOME | End: 2021-03-15
Attending: NURSE PRACTITIONER
Payer: COMMERCIAL

## 2021-03-15 DIAGNOSIS — M14.671: ICD-10-CM

## 2021-03-15 DIAGNOSIS — E11.621: Primary | ICD-10-CM

## 2021-03-15 DIAGNOSIS — Z89.412: ICD-10-CM

## 2021-03-15 DIAGNOSIS — I10: ICD-10-CM

## 2021-03-15 DIAGNOSIS — L08.9: ICD-10-CM

## 2021-03-15 DIAGNOSIS — L97.412: ICD-10-CM

## 2021-03-15 DIAGNOSIS — E11.42: ICD-10-CM

## 2021-03-15 DIAGNOSIS — B96.89: ICD-10-CM

## 2021-03-15 PROCEDURE — 29445 APPL RIGID TOT CNTC LEG CAST: CPT

## 2021-03-18 ENCOUNTER — HOSPITAL ENCOUNTER (OUTPATIENT)
Dept: HOSPITAL 92 - CSHWCC | Age: 51
Discharge: HOME | End: 2021-03-18
Attending: NURSE PRACTITIONER
Payer: COMMERCIAL

## 2021-03-18 DIAGNOSIS — M14.671: ICD-10-CM

## 2021-03-18 DIAGNOSIS — B96.89: ICD-10-CM

## 2021-03-18 DIAGNOSIS — E11.621: Primary | ICD-10-CM

## 2021-03-18 DIAGNOSIS — Z89.412: ICD-10-CM

## 2021-03-18 DIAGNOSIS — I10: ICD-10-CM

## 2021-03-18 DIAGNOSIS — E11.42: ICD-10-CM

## 2021-03-18 DIAGNOSIS — L08.9: ICD-10-CM

## 2021-03-18 DIAGNOSIS — L97.412: ICD-10-CM

## 2021-03-18 PROCEDURE — 99213 OFFICE O/P EST LOW 20 MIN: CPT

## 2021-03-18 PROCEDURE — G0463 HOSPITAL OUTPT CLINIC VISIT: HCPCS

## 2021-03-22 ENCOUNTER — HOSPITAL ENCOUNTER (OUTPATIENT)
Dept: HOSPITAL 92 - CSHWCC | Age: 51
Discharge: HOME | End: 2021-03-22
Attending: NURSE PRACTITIONER
Payer: COMMERCIAL

## 2021-03-22 DIAGNOSIS — E11.42: ICD-10-CM

## 2021-03-22 DIAGNOSIS — L08.9: ICD-10-CM

## 2021-03-22 DIAGNOSIS — B96.89: ICD-10-CM

## 2021-03-22 DIAGNOSIS — E11.621: Primary | ICD-10-CM

## 2021-03-22 DIAGNOSIS — Z89.412: ICD-10-CM

## 2021-03-22 DIAGNOSIS — I10: ICD-10-CM

## 2021-03-22 DIAGNOSIS — L97.412: ICD-10-CM

## 2021-03-22 DIAGNOSIS — M14.671: ICD-10-CM

## 2021-03-22 PROCEDURE — 11042 DBRDMT SUBQ TIS 1ST 20SQCM/<: CPT

## 2021-03-22 PROCEDURE — 29445 APPL RIGID TOT CNTC LEG CAST: CPT

## 2021-03-25 ENCOUNTER — HOSPITAL ENCOUNTER (OUTPATIENT)
Dept: HOSPITAL 92 - CSHWCC | Age: 51
Discharge: HOME | End: 2021-03-25
Attending: NURSE PRACTITIONER
Payer: COMMERCIAL

## 2021-03-25 DIAGNOSIS — L97.412: ICD-10-CM

## 2021-03-25 DIAGNOSIS — I10: ICD-10-CM

## 2021-03-25 DIAGNOSIS — L08.9: ICD-10-CM

## 2021-03-25 DIAGNOSIS — E11.42: ICD-10-CM

## 2021-03-25 DIAGNOSIS — E11.621: Primary | ICD-10-CM

## 2021-03-25 DIAGNOSIS — M14.671: ICD-10-CM

## 2021-03-25 DIAGNOSIS — Z89.412: ICD-10-CM

## 2021-03-25 DIAGNOSIS — B96.89: ICD-10-CM

## 2021-03-25 PROCEDURE — 99212 OFFICE O/P EST SF 10 MIN: CPT

## 2021-03-25 PROCEDURE — G0463 HOSPITAL OUTPT CLINIC VISIT: HCPCS

## 2021-03-25 PROCEDURE — 29445 APPL RIGID TOT CNTC LEG CAST: CPT

## 2021-03-29 ENCOUNTER — HOSPITAL ENCOUNTER (OUTPATIENT)
Dept: HOSPITAL 92 - CSHWCC | Age: 51
Discharge: HOME | End: 2021-03-29
Attending: NURSE PRACTITIONER
Payer: COMMERCIAL

## 2021-03-29 DIAGNOSIS — I10: ICD-10-CM

## 2021-03-29 DIAGNOSIS — B96.89: ICD-10-CM

## 2021-03-29 DIAGNOSIS — L08.9: ICD-10-CM

## 2021-03-29 DIAGNOSIS — E11.42: ICD-10-CM

## 2021-03-29 DIAGNOSIS — L97.412: ICD-10-CM

## 2021-03-29 DIAGNOSIS — E11.621: Primary | ICD-10-CM

## 2021-03-29 DIAGNOSIS — M14.671: ICD-10-CM

## 2021-03-29 DIAGNOSIS — Z89.412: ICD-10-CM

## 2021-03-29 PROCEDURE — G0463 HOSPITAL OUTPT CLINIC VISIT: HCPCS

## 2021-03-29 PROCEDURE — 29445 APPL RIGID TOT CNTC LEG CAST: CPT

## 2021-03-29 PROCEDURE — 99213 OFFICE O/P EST LOW 20 MIN: CPT

## 2021-04-01 ENCOUNTER — HOSPITAL ENCOUNTER (OUTPATIENT)
Dept: HOSPITAL 92 - CSHWCC | Age: 51
Discharge: HOME | End: 2021-04-01
Attending: NURSE PRACTITIONER
Payer: COMMERCIAL

## 2021-04-01 DIAGNOSIS — L08.9: ICD-10-CM

## 2021-04-01 DIAGNOSIS — L97.412: ICD-10-CM

## 2021-04-01 DIAGNOSIS — M14.671: ICD-10-CM

## 2021-04-01 DIAGNOSIS — E11.42: ICD-10-CM

## 2021-04-01 DIAGNOSIS — Z89.412: ICD-10-CM

## 2021-04-01 DIAGNOSIS — B96.89: ICD-10-CM

## 2021-04-01 DIAGNOSIS — E11.621: Primary | ICD-10-CM

## 2021-04-01 DIAGNOSIS — I10: ICD-10-CM

## 2021-04-01 PROCEDURE — 29445 APPL RIGID TOT CNTC LEG CAST: CPT

## 2021-04-05 ENCOUNTER — HOSPITAL ENCOUNTER (OUTPATIENT)
Dept: HOSPITAL 92 - CSHWCC | Age: 51
Discharge: HOME | End: 2021-04-05
Attending: NURSE PRACTITIONER
Payer: COMMERCIAL

## 2021-04-05 DIAGNOSIS — B96.89: ICD-10-CM

## 2021-04-05 DIAGNOSIS — E11.42: ICD-10-CM

## 2021-04-05 DIAGNOSIS — L08.89: ICD-10-CM

## 2021-04-05 DIAGNOSIS — M14.671: ICD-10-CM

## 2021-04-05 DIAGNOSIS — I10: ICD-10-CM

## 2021-04-05 DIAGNOSIS — E11.621: Primary | ICD-10-CM

## 2021-04-05 DIAGNOSIS — L97.412: ICD-10-CM

## 2021-04-05 DIAGNOSIS — Z89.412: ICD-10-CM

## 2021-04-05 PROCEDURE — 29445 APPL RIGID TOT CNTC LEG CAST: CPT

## 2021-04-08 ENCOUNTER — HOSPITAL ENCOUNTER (OUTPATIENT)
Dept: HOSPITAL 92 - CSHWCC | Age: 51
Discharge: HOME | End: 2021-04-08
Attending: NURSE PRACTITIONER
Payer: COMMERCIAL

## 2021-04-08 DIAGNOSIS — E11.42: ICD-10-CM

## 2021-04-08 DIAGNOSIS — L97.412: ICD-10-CM

## 2021-04-08 DIAGNOSIS — L08.89: ICD-10-CM

## 2021-04-08 DIAGNOSIS — M14.671: ICD-10-CM

## 2021-04-08 DIAGNOSIS — B96.89: ICD-10-CM

## 2021-04-08 DIAGNOSIS — E11.621: Primary | ICD-10-CM

## 2021-04-08 DIAGNOSIS — Z89.412: ICD-10-CM

## 2021-04-08 DIAGNOSIS — I10: ICD-10-CM

## 2021-04-08 PROCEDURE — 99212 OFFICE O/P EST SF 10 MIN: CPT

## 2021-04-08 PROCEDURE — G0463 HOSPITAL OUTPT CLINIC VISIT: HCPCS

## 2021-04-12 ENCOUNTER — HOSPITAL ENCOUNTER (OUTPATIENT)
Dept: HOSPITAL 92 - CSHWCC | Age: 51
Discharge: HOME | End: 2021-04-12
Attending: NURSE PRACTITIONER
Payer: COMMERCIAL

## 2021-04-12 DIAGNOSIS — M14.671: ICD-10-CM

## 2021-04-12 DIAGNOSIS — Z89.412: ICD-10-CM

## 2021-04-12 DIAGNOSIS — L97.412: ICD-10-CM

## 2021-04-12 DIAGNOSIS — B96.89: ICD-10-CM

## 2021-04-12 DIAGNOSIS — E11.621: Primary | ICD-10-CM

## 2021-04-12 DIAGNOSIS — L08.9: ICD-10-CM

## 2021-04-12 DIAGNOSIS — I10: ICD-10-CM

## 2021-04-12 DIAGNOSIS — E11.42: ICD-10-CM

## 2021-04-12 PROCEDURE — 99213 OFFICE O/P EST LOW 20 MIN: CPT

## 2021-04-12 PROCEDURE — 29445 APPL RIGID TOT CNTC LEG CAST: CPT

## 2021-04-12 PROCEDURE — 11042 DBRDMT SUBQ TIS 1ST 20SQCM/<: CPT

## 2021-04-12 PROCEDURE — G0463 HOSPITAL OUTPT CLINIC VISIT: HCPCS

## 2021-04-15 ENCOUNTER — HOSPITAL ENCOUNTER (OUTPATIENT)
Dept: HOSPITAL 92 - CSHWCC | Age: 51
Discharge: HOME | End: 2021-04-15
Attending: NURSE PRACTITIONER
Payer: COMMERCIAL

## 2021-04-15 DIAGNOSIS — Z89.412: ICD-10-CM

## 2021-04-15 DIAGNOSIS — L08.89: ICD-10-CM

## 2021-04-15 DIAGNOSIS — L97.412: ICD-10-CM

## 2021-04-15 DIAGNOSIS — I10: ICD-10-CM

## 2021-04-15 DIAGNOSIS — B96.89: ICD-10-CM

## 2021-04-15 DIAGNOSIS — M14.671: ICD-10-CM

## 2021-04-15 DIAGNOSIS — E11.42: ICD-10-CM

## 2021-04-15 DIAGNOSIS — E11.621: Primary | ICD-10-CM

## 2021-04-15 PROCEDURE — G0463 HOSPITAL OUTPT CLINIC VISIT: HCPCS

## 2021-04-15 PROCEDURE — 99213 OFFICE O/P EST LOW 20 MIN: CPT

## 2021-04-15 PROCEDURE — 29445 APPL RIGID TOT CNTC LEG CAST: CPT

## 2021-04-19 ENCOUNTER — HOSPITAL ENCOUNTER (OUTPATIENT)
Dept: HOSPITAL 92 - CSHWCC | Age: 51
Discharge: HOME | End: 2021-04-19
Attending: NURSE PRACTITIONER
Payer: COMMERCIAL

## 2021-04-19 DIAGNOSIS — I10: ICD-10-CM

## 2021-04-19 DIAGNOSIS — M14.671: ICD-10-CM

## 2021-04-19 DIAGNOSIS — E11.621: Primary | ICD-10-CM

## 2021-04-19 DIAGNOSIS — B96.89: ICD-10-CM

## 2021-04-19 DIAGNOSIS — L97.412: ICD-10-CM

## 2021-04-19 DIAGNOSIS — Z89.412: ICD-10-CM

## 2021-04-19 DIAGNOSIS — L08.89: ICD-10-CM

## 2021-04-19 DIAGNOSIS — E11.42: ICD-10-CM

## 2021-04-19 PROCEDURE — 29445 APPL RIGID TOT CNTC LEG CAST: CPT

## 2021-04-22 ENCOUNTER — HOSPITAL ENCOUNTER (OUTPATIENT)
Dept: HOSPITAL 92 - CSHWCC | Age: 51
Discharge: HOME | End: 2021-04-22
Attending: NURSE PRACTITIONER
Payer: COMMERCIAL

## 2021-04-22 DIAGNOSIS — Z89.412: ICD-10-CM

## 2021-04-22 DIAGNOSIS — M14.671: ICD-10-CM

## 2021-04-22 DIAGNOSIS — L97.412: ICD-10-CM

## 2021-04-22 DIAGNOSIS — E11.621: Primary | ICD-10-CM

## 2021-04-22 DIAGNOSIS — E11.42: ICD-10-CM

## 2021-04-22 DIAGNOSIS — L08.89: ICD-10-CM

## 2021-04-22 DIAGNOSIS — I10: ICD-10-CM

## 2021-04-22 DIAGNOSIS — B96.89: ICD-10-CM

## 2021-04-22 PROCEDURE — 99213 OFFICE O/P EST LOW 20 MIN: CPT

## 2021-04-22 PROCEDURE — G0463 HOSPITAL OUTPT CLINIC VISIT: HCPCS

## 2021-04-22 PROCEDURE — 29445 APPL RIGID TOT CNTC LEG CAST: CPT

## 2021-04-26 ENCOUNTER — HOSPITAL ENCOUNTER (OUTPATIENT)
Dept: HOSPITAL 92 - CSHWCC | Age: 51
Discharge: HOME | End: 2021-04-26
Attending: NURSE PRACTITIONER
Payer: COMMERCIAL

## 2021-04-26 DIAGNOSIS — L08.9: ICD-10-CM

## 2021-04-26 DIAGNOSIS — I10: ICD-10-CM

## 2021-04-26 DIAGNOSIS — E11.621: Primary | ICD-10-CM

## 2021-04-26 DIAGNOSIS — M14.671: ICD-10-CM

## 2021-04-26 DIAGNOSIS — L97.412: ICD-10-CM

## 2021-04-26 DIAGNOSIS — B96.89: ICD-10-CM

## 2021-04-26 DIAGNOSIS — E11.42: ICD-10-CM

## 2021-04-26 DIAGNOSIS — Z89.412: ICD-10-CM

## 2021-04-26 PROCEDURE — 99212 OFFICE O/P EST SF 10 MIN: CPT

## 2021-04-26 PROCEDURE — G0463 HOSPITAL OUTPT CLINIC VISIT: HCPCS

## 2021-06-14 ENCOUNTER — HOSPITAL ENCOUNTER (OUTPATIENT)
Dept: HOSPITAL 92 - CSHWCC | Age: 51
Discharge: HOME | End: 2021-06-14
Attending: NURSE PRACTITIONER
Payer: COMMERCIAL

## 2021-06-14 DIAGNOSIS — E11.42: ICD-10-CM

## 2021-06-14 DIAGNOSIS — I10: ICD-10-CM

## 2021-06-14 DIAGNOSIS — L08.9: ICD-10-CM

## 2021-06-14 DIAGNOSIS — L97.412: ICD-10-CM

## 2021-06-14 DIAGNOSIS — M14.671: ICD-10-CM

## 2021-06-14 DIAGNOSIS — Z89.412: ICD-10-CM

## 2021-06-14 DIAGNOSIS — B96.89: ICD-10-CM

## 2021-06-14 DIAGNOSIS — E11.621: Primary | ICD-10-CM

## 2021-09-02 ENCOUNTER — HOSPITAL ENCOUNTER (OUTPATIENT)
Dept: HOSPITAL 92 - CSHWCC | Age: 51
Discharge: HOME | End: 2021-09-02
Attending: NURSE PRACTITIONER
Payer: COMMERCIAL

## 2021-09-02 DIAGNOSIS — E11.42: ICD-10-CM

## 2021-09-02 DIAGNOSIS — M14.671: ICD-10-CM

## 2021-09-02 DIAGNOSIS — I10: ICD-10-CM

## 2021-09-02 DIAGNOSIS — Z89.511: ICD-10-CM

## 2021-09-02 DIAGNOSIS — E11.621: ICD-10-CM

## 2021-09-02 DIAGNOSIS — L08.9: ICD-10-CM

## 2021-09-02 DIAGNOSIS — B96.89: ICD-10-CM

## 2021-09-02 DIAGNOSIS — Z89.412: ICD-10-CM

## 2021-09-02 DIAGNOSIS — T81.89XD: Primary | ICD-10-CM

## 2021-09-02 PROCEDURE — 99213 OFFICE O/P EST LOW 20 MIN: CPT

## 2021-09-02 PROCEDURE — G0463 HOSPITAL OUTPT CLINIC VISIT: HCPCS

## 2021-10-25 ENCOUNTER — HOSPITAL ENCOUNTER (OUTPATIENT)
Dept: HOSPITAL 92 - CSHWCC | Age: 51
Discharge: HOME | End: 2021-10-25
Attending: NURSE PRACTITIONER
Payer: COMMERCIAL

## 2021-10-25 DIAGNOSIS — E11.42: ICD-10-CM

## 2021-10-25 DIAGNOSIS — M14.671: ICD-10-CM

## 2021-10-25 DIAGNOSIS — E11.621: ICD-10-CM

## 2021-10-25 DIAGNOSIS — B96.89: ICD-10-CM

## 2021-10-25 DIAGNOSIS — Z89.412: ICD-10-CM

## 2021-10-25 DIAGNOSIS — T81.30XD: Primary | ICD-10-CM

## 2021-10-25 DIAGNOSIS — I10: ICD-10-CM

## 2021-10-25 DIAGNOSIS — L97.509: ICD-10-CM

## 2021-10-25 DIAGNOSIS — L08.9: ICD-10-CM

## 2021-10-25 DIAGNOSIS — Z89.511: ICD-10-CM

## 2021-10-25 PROCEDURE — 99213 OFFICE O/P EST LOW 20 MIN: CPT

## 2021-10-25 PROCEDURE — G0463 HOSPITAL OUTPT CLINIC VISIT: HCPCS

## 2021-11-02 ENCOUNTER — HOSPITAL ENCOUNTER (OUTPATIENT)
Dept: HOSPITAL 92 - CSHWCC | Age: 51
Discharge: HOME | End: 2021-11-02
Attending: NURSE PRACTITIONER
Payer: COMMERCIAL

## 2021-11-02 DIAGNOSIS — L08.9: ICD-10-CM

## 2021-11-02 DIAGNOSIS — L97.509: ICD-10-CM

## 2021-11-02 DIAGNOSIS — E11.42: ICD-10-CM

## 2021-11-02 DIAGNOSIS — Z89.511: ICD-10-CM

## 2021-11-02 DIAGNOSIS — T81.30XD: Primary | ICD-10-CM

## 2021-11-02 DIAGNOSIS — Z89.412: ICD-10-CM

## 2021-11-02 DIAGNOSIS — M14.671: ICD-10-CM

## 2021-11-02 DIAGNOSIS — B96.89: ICD-10-CM

## 2021-11-02 DIAGNOSIS — E11.621: ICD-10-CM

## 2021-11-02 DIAGNOSIS — I10: ICD-10-CM

## 2021-11-02 PROCEDURE — 99213 OFFICE O/P EST LOW 20 MIN: CPT

## 2021-11-02 PROCEDURE — G0463 HOSPITAL OUTPT CLINIC VISIT: HCPCS
